# Patient Record
Sex: MALE | Race: BLACK OR AFRICAN AMERICAN | NOT HISPANIC OR LATINO
[De-identification: names, ages, dates, MRNs, and addresses within clinical notes are randomized per-mention and may not be internally consistent; named-entity substitution may affect disease eponyms.]

---

## 2018-05-01 ENCOUNTER — APPOINTMENT (OUTPATIENT)
Dept: ORTHOPEDIC SURGERY | Facility: CLINIC | Age: 73
End: 2018-05-01
Payer: MEDICARE

## 2018-05-01 VITALS
HEIGHT: 78 IN | WEIGHT: 310 LBS | DIASTOLIC BLOOD PRESSURE: 74 MMHG | SYSTOLIC BLOOD PRESSURE: 158 MMHG | BODY MASS INDEX: 35.87 KG/M2 | HEART RATE: 67 BPM

## 2018-05-01 DIAGNOSIS — M17.31 UNILATERAL POST-TRAUMATIC OSTEOARTHRITIS, RIGHT KNEE: ICD-10-CM

## 2018-05-01 PROCEDURE — 99213 OFFICE O/P EST LOW 20 MIN: CPT

## 2018-05-01 RX ORDER — IPRATROPIUM BROMIDE 42 UG/1
0.06 SPRAY NASAL
Qty: 15 | Refills: 0 | Status: ACTIVE | COMMUNITY
Start: 2018-04-04

## 2018-05-01 RX ORDER — TAMSULOSIN HYDROCHLORIDE 0.4 MG/1
0.4 CAPSULE ORAL
Qty: 30 | Refills: 0 | Status: ACTIVE | COMMUNITY
Start: 2017-12-19

## 2018-05-01 RX ORDER — CIPROFLOXACIN HYDROCHLORIDE 500 MG/1
500 TABLET, FILM COATED ORAL
Qty: 16 | Refills: 0 | Status: ACTIVE | COMMUNITY
Start: 2017-11-24

## 2018-05-01 RX ORDER — AMLODIPINE BESYLATE 2.5 MG/1
2.5 TABLET ORAL
Qty: 90 | Refills: 0 | Status: ACTIVE | COMMUNITY
Start: 2018-03-16

## 2018-05-01 RX ORDER — LOSARTAN POTASSIUM 100 MG/1
100 TABLET, FILM COATED ORAL
Qty: 90 | Refills: 0 | Status: ACTIVE | COMMUNITY
Start: 2018-02-15

## 2018-05-01 RX ORDER — FLUTICASONE PROPIONATE 50 UG/1
50 SPRAY, METERED NASAL
Qty: 16 | Refills: 0 | Status: ACTIVE | COMMUNITY
Start: 2018-02-28

## 2018-05-01 RX ORDER — ROSUVASTATIN CALCIUM 10 MG/1
10 TABLET, FILM COATED ORAL
Qty: 90 | Refills: 0 | Status: ACTIVE | COMMUNITY
Start: 2018-01-29

## 2018-05-01 RX ORDER — PHENAZOPYRIDINE HYDROCHLORIDE 200 MG/1
200 TABLET ORAL
Qty: 12 | Refills: 0 | Status: ACTIVE | COMMUNITY
Start: 2017-11-25

## 2018-05-01 RX ORDER — AZELASTINE HYDROCHLORIDE 137 UG/1
0.1 SPRAY, METERED NASAL
Qty: 30 | Refills: 0 | Status: ACTIVE | COMMUNITY
Start: 2018-03-16

## 2018-05-01 RX ORDER — DIAZEPAM 10 MG/1
10 TABLET ORAL
Qty: 1 | Refills: 0 | Status: ACTIVE | COMMUNITY
Start: 2017-11-24

## 2018-11-01 ENCOUNTER — EMERGENCY (EMERGENCY)
Facility: HOSPITAL | Age: 73
LOS: 1 days | Discharge: HOME | End: 2018-11-01
Attending: EMERGENCY MEDICINE
Payer: MEDICARE

## 2018-11-01 VITALS
HEART RATE: 62 BPM | WEIGHT: 298.06 LBS | DIASTOLIC BLOOD PRESSURE: 85 MMHG | HEIGHT: 78 IN | OXYGEN SATURATION: 98 % | RESPIRATION RATE: 16 BRPM | TEMPERATURE: 98 F | SYSTOLIC BLOOD PRESSURE: 147 MMHG

## 2018-11-01 DIAGNOSIS — N20.0 CALCULUS OF KIDNEY: ICD-10-CM

## 2018-11-01 LAB
ALBUMIN SERPL ELPH-MCNC: 3.8 G/DL — SIGNIFICANT CHANGE UP (ref 3.3–5)
ALP SERPL-CCNC: 45 U/L — SIGNIFICANT CHANGE UP (ref 40–120)
ALT FLD-CCNC: 10 U/L — SIGNIFICANT CHANGE UP (ref 10–45)
ANION GAP SERPL CALC-SCNC: 11 MMOL/L — SIGNIFICANT CHANGE UP (ref 5–17)
APPEARANCE UR: ABNORMAL
AST SERPL-CCNC: 19 U/L — SIGNIFICANT CHANGE UP (ref 10–40)
BACTERIA # UR AUTO: NEGATIVE — SIGNIFICANT CHANGE UP
BASOPHILS # BLD AUTO: 0 K/UL — SIGNIFICANT CHANGE UP (ref 0–0.2)
BASOPHILS NFR BLD AUTO: 0 % — SIGNIFICANT CHANGE UP (ref 0–2)
BILIRUB SERPL-MCNC: 1.3 MG/DL — HIGH (ref 0.2–1.2)
BILIRUB UR-MCNC: NEGATIVE — SIGNIFICANT CHANGE UP
BUN SERPL-MCNC: 14 MG/DL — SIGNIFICANT CHANGE UP (ref 7–23)
CALCIUM SERPL-MCNC: 9.7 MG/DL — SIGNIFICANT CHANGE UP (ref 8.4–10.5)
CHLORIDE SERPL-SCNC: 103 MMOL/L — SIGNIFICANT CHANGE UP (ref 96–108)
CO2 SERPL-SCNC: 27 MMOL/L — SIGNIFICANT CHANGE UP (ref 22–31)
COLOR SPEC: YELLOW — SIGNIFICANT CHANGE UP
CREAT SERPL-MCNC: 1.29 MG/DL — SIGNIFICANT CHANGE UP (ref 0.5–1.3)
DIFF PNL FLD: NEGATIVE — SIGNIFICANT CHANGE UP
EOSINOPHIL # BLD AUTO: 0 K/UL — SIGNIFICANT CHANGE UP (ref 0–0.5)
EOSINOPHIL NFR BLD AUTO: 0.4 % — SIGNIFICANT CHANGE UP (ref 0–6)
EPI CELLS # UR: 0 /HPF — SIGNIFICANT CHANGE UP
GLUCOSE SERPL-MCNC: 103 MG/DL — HIGH (ref 70–99)
GLUCOSE UR QL: NEGATIVE — SIGNIFICANT CHANGE UP
HCT VFR BLD CALC: 40.4 % — SIGNIFICANT CHANGE UP (ref 39–50)
HGB BLD-MCNC: 13.3 G/DL — SIGNIFICANT CHANGE UP (ref 13–17)
HYALINE CASTS # UR AUTO: 15 /LPF — HIGH (ref 0–2)
KETONES UR-MCNC: NEGATIVE — SIGNIFICANT CHANGE UP
LEUKOCYTE ESTERASE UR-ACNC: ABNORMAL
LIDOCAIN IGE QN: 58 U/L — SIGNIFICANT CHANGE UP (ref 7–60)
LYMPHOCYTES # BLD AUTO: 0.6 K/UL — LOW (ref 1–3.3)
LYMPHOCYTES # BLD AUTO: 10.7 % — LOW (ref 13–44)
MCHC RBC-ENTMCNC: 28.5 PG — SIGNIFICANT CHANGE UP (ref 27–34)
MCHC RBC-ENTMCNC: 32.9 GM/DL — SIGNIFICANT CHANGE UP (ref 32–36)
MCV RBC AUTO: 86.6 FL — SIGNIFICANT CHANGE UP (ref 80–100)
MONOCYTES # BLD AUTO: 0.5 K/UL — SIGNIFICANT CHANGE UP (ref 0–0.9)
MONOCYTES NFR BLD AUTO: 8.2 % — SIGNIFICANT CHANGE UP (ref 2–14)
NEUTROPHILS # BLD AUTO: 4.6 K/UL — SIGNIFICANT CHANGE UP (ref 1.8–7.4)
NEUTROPHILS NFR BLD AUTO: 80.7 % — HIGH (ref 43–77)
NITRITE UR-MCNC: NEGATIVE — SIGNIFICANT CHANGE UP
PH UR: 6.5 — SIGNIFICANT CHANGE UP (ref 5–8)
PLATELET # BLD AUTO: 177 K/UL — SIGNIFICANT CHANGE UP (ref 150–400)
POTASSIUM SERPL-MCNC: 3.9 MMOL/L — SIGNIFICANT CHANGE UP (ref 3.5–5.3)
POTASSIUM SERPL-SCNC: 3.9 MMOL/L — SIGNIFICANT CHANGE UP (ref 3.5–5.3)
PROT SERPL-MCNC: 7.3 G/DL — SIGNIFICANT CHANGE UP (ref 6–8.3)
PROT UR-MCNC: ABNORMAL
RBC # BLD: 4.66 M/UL — SIGNIFICANT CHANGE UP (ref 4.2–5.8)
RBC # FLD: 13.3 % — SIGNIFICANT CHANGE UP (ref 10.3–14.5)
RBC CASTS # UR COMP ASSIST: 2 /HPF — SIGNIFICANT CHANGE UP (ref 0–4)
SODIUM SERPL-SCNC: 141 MMOL/L — SIGNIFICANT CHANGE UP (ref 135–145)
SP GR SPEC: 1.03 — HIGH (ref 1.01–1.02)
UROBILINOGEN FLD QL: ABNORMAL
WBC # BLD: 5.8 K/UL — SIGNIFICANT CHANGE UP (ref 3.8–10.5)
WBC # FLD AUTO: 5.8 K/UL — SIGNIFICANT CHANGE UP (ref 3.8–10.5)
WBC UR QL: 30 /HPF — HIGH (ref 0–5)

## 2018-11-01 PROCEDURE — 74176 CT ABD & PELVIS W/O CONTRAST: CPT | Mod: 26

## 2018-11-01 PROCEDURE — 99284 EMERGENCY DEPT VISIT MOD MDM: CPT | Mod: GC,25

## 2018-11-01 RX ORDER — MORPHINE SULFATE 50 MG/1
4 CAPSULE, EXTENDED RELEASE ORAL ONCE
Qty: 0 | Refills: 0 | Status: DISCONTINUED | OUTPATIENT
Start: 2018-11-01 | End: 2018-11-01

## 2018-11-01 RX ORDER — CIPROFLOXACIN LACTATE 400MG/40ML
1 VIAL (ML) INTRAVENOUS
Qty: 14 | Refills: 0 | OUTPATIENT
Start: 2018-11-01 | End: 2018-11-07

## 2018-11-01 RX ORDER — OXYCODONE AND ACETAMINOPHEN 5; 325 MG/1; MG/1
1 TABLET ORAL ONCE
Qty: 0 | Refills: 0 | Status: DISCONTINUED | OUTPATIENT
Start: 2018-11-01 | End: 2018-11-01

## 2018-11-01 RX ORDER — SODIUM CHLORIDE 9 MG/ML
1000 INJECTION INTRAMUSCULAR; INTRAVENOUS; SUBCUTANEOUS ONCE
Qty: 0 | Refills: 0 | Status: COMPLETED | OUTPATIENT
Start: 2018-11-01 | End: 2018-11-01

## 2018-11-01 RX ORDER — SODIUM CHLORIDE 9 MG/ML
1000 INJECTION INTRAMUSCULAR; INTRAVENOUS; SUBCUTANEOUS ONCE
Qty: 0 | Refills: 0 | Status: DISCONTINUED | OUTPATIENT
Start: 2018-11-01 | End: 2018-11-01

## 2018-11-01 RX ORDER — CEFTRIAXONE 500 MG/1
1 INJECTION, POWDER, FOR SOLUTION INTRAMUSCULAR; INTRAVENOUS ONCE
Qty: 0 | Refills: 0 | Status: COMPLETED | OUTPATIENT
Start: 2018-11-01 | End: 2018-11-01

## 2018-11-01 RX ORDER — KETOROLAC TROMETHAMINE 30 MG/ML
30 SYRINGE (ML) INJECTION ONCE
Qty: 0 | Refills: 0 | Status: DISCONTINUED | OUTPATIENT
Start: 2018-11-01 | End: 2018-11-01

## 2018-11-01 RX ORDER — OXYCODONE HYDROCHLORIDE 5 MG/1
1 TABLET ORAL
Qty: 12 | Refills: 0 | OUTPATIENT
Start: 2018-11-01 | End: 2018-11-03

## 2018-11-01 RX ADMIN — SODIUM CHLORIDE 1000 MILLILITER(S): 9 INJECTION INTRAMUSCULAR; INTRAVENOUS; SUBCUTANEOUS at 14:40

## 2018-11-01 RX ADMIN — MORPHINE SULFATE 4 MILLIGRAM(S): 50 CAPSULE, EXTENDED RELEASE ORAL at 19:37

## 2018-11-01 RX ADMIN — MORPHINE SULFATE 4 MILLIGRAM(S): 50 CAPSULE, EXTENDED RELEASE ORAL at 21:30

## 2018-11-01 RX ADMIN — OXYCODONE AND ACETAMINOPHEN 1 TABLET(S): 5; 325 TABLET ORAL at 23:57

## 2018-11-01 RX ADMIN — OXYCODONE AND ACETAMINOPHEN 1 TABLET(S): 5; 325 TABLET ORAL at 21:29

## 2018-11-01 RX ADMIN — CEFTRIAXONE 100 GRAM(S): 500 INJECTION, POWDER, FOR SOLUTION INTRAMUSCULAR; INTRAVENOUS at 19:37

## 2018-11-01 RX ADMIN — CEFTRIAXONE 1 GRAM(S): 500 INJECTION, POWDER, FOR SOLUTION INTRAMUSCULAR; INTRAVENOUS at 20:13

## 2018-11-01 RX ADMIN — MORPHINE SULFATE 4 MILLIGRAM(S): 50 CAPSULE, EXTENDED RELEASE ORAL at 20:17

## 2018-11-01 RX ADMIN — MORPHINE SULFATE 4 MILLIGRAM(S): 50 CAPSULE, EXTENDED RELEASE ORAL at 17:13

## 2018-11-01 RX ADMIN — Medication 30 MILLIGRAM(S): at 14:38

## 2018-11-01 RX ADMIN — Medication 30 MILLIGRAM(S): at 15:08

## 2018-11-01 NOTE — ED PROVIDER NOTE - PHYSICAL EXAMINATION
General: well appearing male, no acute distress   HEENt: normocephalic, atraumatic   Respiratory: normal work fo breathing, lungs clear to auscultation bilaterally   Cardiac: regular rate and rhythm   ABdomen: soft, non-tender, no CVA tenderness   MSK: no swelling or tenderness of extremities   Skin: no rashes   NEuro: A&Ox3

## 2018-11-01 NOTE — ED PROVIDER NOTE - SHIFT CHANGE DETAILS
Received sign-out from Dr. Garcia awaiting  eval in anticipation of admission vs placement in CDU due to intractable pain.  has completed their evaluation and have recommended placement in the CDU.

## 2018-11-01 NOTE — ED ADULT NURSE NOTE - NSFALLRSKPASTHIST_ED_ALL_ED
Writer spoke to patient's wife, conveyed message as per NP. Acetic Acid 3% sent via [employer] to [facility] pharmacy by NP. She will wait for the pharmacy to call her to  the Acetic Acid 3%. Writer instruct to contact us if patient has any questions or concerns. Denies further questions or concerns at this time.    no

## 2018-11-01 NOTE — CONSULT NOTE ADULT - PROBLEM SELECTOR RECOMMENDATION 9
-Pt afebrile, VSS, no leukocytosis or signs of infection  -Trial of medical expulsive therapy  -Flomax 0.4mg qHS x30 days  -Percocet PRN pain - may keep in CDU if requiring further pain control, pt appears comfortable  -Zofran PRN nausea  -Senna, colace, miralax  -Aggressive hydration  -Strain urine for calculi  -Return to ER for any fever > 100.4, pain uncontrolled on discharge pain medications, or inability to tolerate PO  -Bactrim x7 days  -Follow up with Dr. Randall in 1 to 2 weeks

## 2018-11-01 NOTE — ED PROVIDER NOTE - ATTENDING CONTRIBUTION TO CARE
Pt with 10/10 L flank pain, radiates to LUQ, has prior KS R side years ago that passed on its own, no recent imaging, exam nontender.

## 2018-11-01 NOTE — ED PROVIDER NOTE - PROGRESS NOTE DETAILS
Dr. Appiah Note: pt with 9/10 pain after meds, will give additional pain meds, check ct scan, s/o to evening doc pending results and pain control.  Anticipating d/c vs CDU depending on results and pain control. updated patient on results. reorts symptoms moderately improved. awaiting UA. - resident Kory Flores Arthur PGY2: pt seen by urology, symptoms improved initially but pain returns despite percocet, pt uncomfortable w/ going home. urology recommended cdu and bactrim x 7 days, would not intervene/ stent at this time, will follow

## 2018-11-01 NOTE — CONSULT NOTE ADULT - ASSESSMENT
73y Male with a PMHx of Acromegaly and previous kidney stone presents with uncomplicated 9 mm left prox stone. 73y Male with a PMHx of Acromegaly and previous kidney stone presents with uncomplicated 9 mm left prox stone. Case discussed with Dr Jim Casillas, will plan to send home with pain control and follow up in office next week.

## 2018-11-01 NOTE — ED ADULT NURSE NOTE - NSIMPLEMENTINTERV_GEN_ALL_ED
Implemented All Universal Safety Interventions:  [place] to call system. Call bell, personal items and telephone within reach. Instruct patient to call for assistance. Room bathroom lighting operational. Non-slip footwear when patient is off stretcher. Physically safe environment: no spills, clutter or unnecessary equipment. Stretcher in lowest position, wheels locked, appropriate side rails in place.

## 2018-11-01 NOTE — ED ADULT NURSE NOTE - CHPI ED NUR SYMPTOMS NEG
no weakness/no fever/no dizziness/no chills/no vomiting/no tingling/no decreased eating/drinking/no nausea

## 2018-11-01 NOTE — CONSULT NOTE ADULT - SUBJECTIVE AND OBJECTIVE BOX
HPI:  Patient is a 73y Male with a PMHx of Acromegaly and previous right kidney stone 4 years ago that passed on its own presenting to the ED with left sided flank pain x1 week. Pain initially thought to be from heavy lifting but increased to 10/10 pain radiating to left groin upon waking up this morning. Pain feels similar to previous stone. CT AP reveals 9 mm left proximal ureteral stone with moderate left hydronephrosis. Pt received Toradol 30 mg IVP x1, Morphine 4 mg IVP x2, Percocet 1 tab x1, still in 6/10 pain. Pt denies fevers, chills, nausea, vomiting, pain or burning with urination, blood in urine, pain or swelling in lower extremities. Pt sees private urologist Dr. Randall.     PAST MEDICAL & SURGICAL HISTORY:  Acromegaly    FAMILY HISTORY:    SOCIAL HISTORY:   Tobacco hx: never smoker    MEDICATIONS  (STANDING):    MEDICATIONS  (PRN):    Allergies    No Known Allergies    Intolerances    REVIEW OF SYSTEMS: Pertinent positives and negatives as stated in HPI, otherwise negative    Vital signs  T(C): 37 (18 @ 20:16), Max: 37 (18 @ 20:16)  HR: 62 (18 @ 20:16)  BP: 129/78 (18 @ 20:16)  SpO2: 99% (18 @ 20:16)  Wt(kg): --    Output    UOP    Physical Exam  Gen: sitting up in stretcher, does not appear to be in pain, NAD  Pulm: No respiratory distress, no subcostal retractions  CV: RRR, no JVD  Abd: Soft, NT, ND  : No discharge or blood at urethral meatus.  Testes descended bilaterally.  Testes and epididymis nontender bilaterally. No CVAT b/l/  MSK: No edema present    LABS:     @ 14:37    WBC 5.8   / Hct 40.4  / SCr 1.29         141  |  103  |  14  ----------------------------<  103<H>  3.9   |  27  |  1.29    Ca    9.7      2018 14:37    TPro  7.3  /  Alb  3.8  /  TBili  1.3<H>  /  DBili  x   /  AST  19  /  ALT  10  /  AlkPhos  45  11-01      Urinalysis Basic - ( 2018 18:50 )    Color: Yellow / Appearance: Slightly Turbid / S.028 / pH: x  Gluc: x / Ketone: Negative  / Bili: Negative / Urobili: 2 mg/dL   Blood: x / Protein: 30 mg/dL / Nitrite: Negative   Leuk Esterase: Large / RBC: 2 /hpf / WBC 30 /hpf   Sq Epi: x / Non Sq Epi: 0 /hpf / Bacteria: Negative        Urine Cx: pending      RADIOLOGY:  < from: CT Abdomen and Pelvis No Cont (18 @ 16:14) >  KIDNEYS/URETERS: Moderate left hydroureteronephrosis, secondary to a 0.9   cm calculus in the proximal left ureter. There is associated left   perinephric stranding. A 2 mm nonobstructing left intrarenal calculus is   noted in a lower pole calyx. Left renal cysts are again noted. No   hydronephrosis of the right kidney. A punctate nonobstructing calculus is   seen in the lower pole of the right kidney.    IMPRESSION: Moderate left hydroureteronephrosis, secondary to a 0.9 cm   calculus in the proximal left ureter. There is associated left   perinephric stranding. Small nonobstructing intrarenal calculi are   present bilaterally.    < end of copied text >

## 2018-11-01 NOTE — ED ADULT NURSE NOTE - OBJECTIVE STATEMENT
73 year old male presents to the ED ambulatory through waiting room with wife complaining of 10/10 left flank pain x 1 week, worsening yesterday and this morning. PMH of right kidney stones in 2015, HTN, HLD. Pt. states this feels the same as when he had kidney stones in the past. Patient denies fever, chills, nausea, vomiting, diarrhea, dysuria, hematuria, recent travel, recent sick contacts. 20g peripheral IV placed in right AC and labs drawn and sent to lab. Patient undressed and placed into gown, call bell in hand and side rails up with bed in lowest position for safety. blanket provided. Comfort and safety provided.

## 2018-11-01 NOTE — ED PROVIDER NOTE - OBJECTIVE STATEMENT
73M, pmh of acromegaly, kidney stone, presenting with flank pain. Patient reports general back pain for the past week which signficant worsened today. Pain is left sided and radiates to stomach/groin. Feels similar to previous kidney stone. Denies any fever, nausea, vomiting, pain or burning with urination, blood in urine, pain or swelling in lower extremities. Pain is currently 9/10, did not try pain medicine at home. Does not believe he ever had a CT of his abdomen.

## 2018-11-02 VITALS
RESPIRATION RATE: 18 BRPM | SYSTOLIC BLOOD PRESSURE: 140 MMHG | OXYGEN SATURATION: 98 % | HEART RATE: 72 BPM | DIASTOLIC BLOOD PRESSURE: 80 MMHG

## 2018-11-02 LAB
ANION GAP SERPL CALC-SCNC: 11 MMOL/L — SIGNIFICANT CHANGE UP (ref 5–17)
ANION GAP SERPL CALC-SCNC: 9 MMOL/L — SIGNIFICANT CHANGE UP (ref 5–17)
BUN SERPL-MCNC: 15 MG/DL — SIGNIFICANT CHANGE UP (ref 7–23)
BUN SERPL-MCNC: 16 MG/DL — SIGNIFICANT CHANGE UP (ref 7–23)
CALCIUM SERPL-MCNC: 9.1 MG/DL — SIGNIFICANT CHANGE UP (ref 8.4–10.5)
CALCIUM SERPL-MCNC: 9.3 MG/DL — SIGNIFICANT CHANGE UP (ref 8.4–10.5)
CHLORIDE SERPL-SCNC: 102 MMOL/L — SIGNIFICANT CHANGE UP (ref 96–108)
CHLORIDE SERPL-SCNC: 105 MMOL/L — SIGNIFICANT CHANGE UP (ref 96–108)
CO2 SERPL-SCNC: 22 MMOL/L — SIGNIFICANT CHANGE UP (ref 22–31)
CO2 SERPL-SCNC: 26 MMOL/L — SIGNIFICANT CHANGE UP (ref 22–31)
CREAT SERPL-MCNC: 1.51 MG/DL — HIGH (ref 0.5–1.3)
CREAT SERPL-MCNC: 1.55 MG/DL — HIGH (ref 0.5–1.3)
CULTURE RESULTS: SIGNIFICANT CHANGE UP
GLUCOSE SERPL-MCNC: 100 MG/DL — HIGH (ref 70–99)
GLUCOSE SERPL-MCNC: 103 MG/DL — HIGH (ref 70–99)
POTASSIUM SERPL-MCNC: 4.3 MMOL/L — SIGNIFICANT CHANGE UP (ref 3.5–5.3)
POTASSIUM SERPL-MCNC: 4.5 MMOL/L — SIGNIFICANT CHANGE UP (ref 3.5–5.3)
POTASSIUM SERPL-SCNC: 4.3 MMOL/L — SIGNIFICANT CHANGE UP (ref 3.5–5.3)
POTASSIUM SERPL-SCNC: 4.5 MMOL/L — SIGNIFICANT CHANGE UP (ref 3.5–5.3)
SODIUM SERPL-SCNC: 136 MMOL/L — SIGNIFICANT CHANGE UP (ref 135–145)
SODIUM SERPL-SCNC: 139 MMOL/L — SIGNIFICANT CHANGE UP (ref 135–145)
SPECIMEN SOURCE: SIGNIFICANT CHANGE UP

## 2018-11-02 PROCEDURE — 80053 COMPREHEN METABOLIC PANEL: CPT

## 2018-11-02 PROCEDURE — 96376 TX/PRO/DX INJ SAME DRUG ADON: CPT

## 2018-11-02 PROCEDURE — 85027 COMPLETE CBC AUTOMATED: CPT

## 2018-11-02 PROCEDURE — 96375 TX/PRO/DX INJ NEW DRUG ADDON: CPT

## 2018-11-02 PROCEDURE — 87086 URINE CULTURE/COLONY COUNT: CPT

## 2018-11-02 PROCEDURE — 83690 ASSAY OF LIPASE: CPT

## 2018-11-02 PROCEDURE — 80048 BASIC METABOLIC PNL TOTAL CA: CPT

## 2018-11-02 PROCEDURE — 74176 CT ABD & PELVIS W/O CONTRAST: CPT

## 2018-11-02 PROCEDURE — 81001 URINALYSIS AUTO W/SCOPE: CPT

## 2018-11-02 PROCEDURE — 96365 THER/PROPH/DIAG IV INF INIT: CPT

## 2018-11-02 PROCEDURE — G0378: CPT

## 2018-11-02 PROCEDURE — 99284 EMERGENCY DEPT VISIT MOD MDM: CPT | Mod: 25

## 2018-11-02 PROCEDURE — 99236 HOSP IP/OBS SAME DATE HI 85: CPT

## 2018-11-02 RX ORDER — TAMSULOSIN HYDROCHLORIDE 0.4 MG/1
0.4 CAPSULE ORAL AT BEDTIME
Qty: 0 | Refills: 0 | Status: DISCONTINUED | OUTPATIENT
Start: 2018-11-02 | End: 2018-11-05

## 2018-11-02 RX ORDER — ATORVASTATIN CALCIUM 80 MG/1
40 TABLET, FILM COATED ORAL AT BEDTIME
Qty: 0 | Refills: 0 | Status: DISCONTINUED | OUTPATIENT
Start: 2018-11-02 | End: 2018-11-05

## 2018-11-02 RX ORDER — OXYCODONE AND ACETAMINOPHEN 5; 325 MG/1; MG/1
1 TABLET ORAL EVERY 4 HOURS
Qty: 0 | Refills: 0 | Status: DISCONTINUED | OUTPATIENT
Start: 2018-11-02 | End: 2018-11-02

## 2018-11-02 RX ORDER — LOSARTAN POTASSIUM 100 MG/1
25 TABLET, FILM COATED ORAL DAILY
Qty: 0 | Refills: 0 | Status: DISCONTINUED | OUTPATIENT
Start: 2018-11-02 | End: 2018-11-05

## 2018-11-02 RX ORDER — SODIUM CHLORIDE 9 MG/ML
1000 INJECTION INTRAMUSCULAR; INTRAVENOUS; SUBCUTANEOUS
Qty: 0 | Refills: 0 | Status: DISCONTINUED | OUTPATIENT
Start: 2018-11-02 | End: 2018-11-05

## 2018-11-02 RX ORDER — AZTREONAM 2 G
1 VIAL (EA) INJECTION
Qty: 14 | Refills: 0
Start: 2018-11-02 | End: 2018-11-08

## 2018-11-02 RX ORDER — ONDANSETRON 8 MG/1
4 TABLET, FILM COATED ORAL EVERY 4 HOURS
Qty: 0 | Refills: 0 | Status: DISCONTINUED | OUTPATIENT
Start: 2018-11-02 | End: 2018-11-05

## 2018-11-02 RX ORDER — TAMSULOSIN HYDROCHLORIDE 0.4 MG/1
1 CAPSULE ORAL
Qty: 7 | Refills: 0
Start: 2018-11-02 | End: 2018-11-08

## 2018-11-02 RX ADMIN — Medication 1 TABLET(S): at 06:47

## 2018-11-02 RX ADMIN — OXYCODONE AND ACETAMINOPHEN 1 TABLET(S): 5; 325 TABLET ORAL at 15:05

## 2018-11-02 RX ADMIN — SODIUM CHLORIDE 125 MILLILITER(S): 9 INJECTION INTRAMUSCULAR; INTRAVENOUS; SUBCUTANEOUS at 07:52

## 2018-11-02 RX ADMIN — SODIUM CHLORIDE 125 MILLILITER(S): 9 INJECTION INTRAMUSCULAR; INTRAVENOUS; SUBCUTANEOUS at 13:10

## 2018-11-02 RX ADMIN — SODIUM CHLORIDE 125 MILLILITER(S): 9 INJECTION INTRAMUSCULAR; INTRAVENOUS; SUBCUTANEOUS at 00:31

## 2018-11-02 RX ADMIN — TAMSULOSIN HYDROCHLORIDE 0.4 MILLIGRAM(S): 0.4 CAPSULE ORAL at 00:31

## 2018-11-02 RX ADMIN — ATORVASTATIN CALCIUM 40 MILLIGRAM(S): 80 TABLET, FILM COATED ORAL at 00:31

## 2018-11-02 RX ADMIN — OXYCODONE AND ACETAMINOPHEN 1 TABLET(S): 5; 325 TABLET ORAL at 07:51

## 2018-11-02 RX ADMIN — SODIUM CHLORIDE 125 MILLILITER(S): 9 INJECTION INTRAMUSCULAR; INTRAVENOUS; SUBCUTANEOUS at 15:11

## 2018-11-02 RX ADMIN — OXYCODONE AND ACETAMINOPHEN 1 TABLET(S): 5; 325 TABLET ORAL at 15:04

## 2018-11-02 RX ADMIN — OXYCODONE AND ACETAMINOPHEN 1 TABLET(S): 5; 325 TABLET ORAL at 11:00

## 2018-11-02 RX ADMIN — SODIUM CHLORIDE 125 MILLILITER(S): 9 INJECTION INTRAMUSCULAR; INTRAVENOUS; SUBCUTANEOUS at 13:11

## 2018-11-02 RX ADMIN — LOSARTAN POTASSIUM 25 MILLIGRAM(S): 100 TABLET, FILM COATED ORAL at 06:47

## 2018-11-02 NOTE — ED CDU PROVIDER DISPOSITION NOTE - CLINICAL COURSE
72y/o M with PMH of acromegaly, HTN, HLD, kidney stones, c/o L flank pain x 1 wk. Patient reports general back pain for the past week which signficantly worsened today. Pain is left sided and radiates to L abdomen. pt states the pain feels similar to previous kidney stone. Pain was 10/10, did not try pain medicine at home. Notes having some chills and nausea with pain. Denies any fever, sweats, vomiting, pain or burning with urination, blood in urine, urinary frequency/urgency, pain or swelling in lower extremities, CP, SOB, problems walking.    In ED, UA positive for UTI, otherwise labs unremarkable, CT a/p showed moderate L hydro and stranding with 0.9cm L proximal ureter stone and small nonobstructing intrarenal calculi b/l. urology c/s'd recommend fluids, straining, flomax, bactrim, pain control. pt sent to CDU for uro recs and monitoring. 72y/o M with PMH of acromegaly, HTN, HLD, kidney stones, c/o L flank pain x 1 wk. Patient reports general back pain for the past week which signficantly worsened today. Pain is left sided and radiates to L abdomen. pt states the pain feels similar to previous kidney stone. Pain was 10/10, did not try pain medicine at home. Notes having some chills and nausea with pain. Denies any fever, sweats, vomiting, pain or burning with urination, blood in urine, urinary frequency/urgency, pain or swelling in lower extremities, CP, SOB, problems walking.    In ED, UA positive for UTI, otherwise labs unremarkable, CT a/p showed moderate L hydro and stranding with 0.9cm L proximal ureter stone and small nonobstructing intrarenal calculi b/l. urology c/s'd recommend fluids, straining, flomax, bactrim, pain control. pt sent to CDU for uro recs and monitoring. Patient's pain was still 10/10 in AM w/ improvement to 8/10 w/ percocet. Repeat creatinine was elevated to 1.55 up from 1.29. Uro came to evaluate patient again and felt despite Cr bump, if pain controlled then can d/c. Continued IVF during day, repeat Cr was 1.51 72y/o M with PMH of acromegaly, HTN, HLD, kidney stones, c/o L flank pain x 1 wk. Patient reports general back pain for the past week which significantly worsened today. Pain is left sided and radiates to L abdomen. pt states the pain feels similar to previous kidney stone. Pain was 10/10, did not try pain medicine at home. Notes having some chills and nausea with pain. Denies any fever, sweats, vomiting, pain or burning with urination, blood in urine, urinary frequency/urgency, pain or swelling in lower extremities, CP, SOB, problems walking.    In ED, UA positive for UTI, otherwise labs unremarkable, CT a/p showed moderate L hydro and stranding with 0.9cm L proximal ureter stone and small nonobstructing intrarenal calculi b/l. urology c/s'd recommend fluids, straining, flomax, bactrim, pain control. pt sent to CDU for uro recs and monitoring. Patient's pain was still 10/10 in AM w/ improvement to 8/10 w/ percocet. Repeat creatinine was elevated to 1.55 up from 1.29. Uro came to evaluate patient again and felt despite Cr bump, if pain controlled then can d/c. Continued IVF during day, repeat Cr was 1.51. Pain control improved to point where level after percocet was 5/10 and tolerable for pt. Case was discussed w/ patient's urologist Dr. Randall who did not feel patient needed to be admitted and felt comfortable w/ patient being discharged home and having outpatient procedure done. Spoke w/ ED attending who was agreeable with this plan as was the patient. Stable at time of discharge, given all follow up information, discharge info, medication prescriptions and strict return precautions. Case discussed with ED attending. 72y/o M with PMH of acromegaly, HTN, HLD, kidney stones, c/o L flank pain x 1 wk. Patient reports general back pain for the past week which significantly worsened today. Pain is left sided and radiates to L abdomen. pt states the pain feels similar to previous kidney stone. Pain was 10/10, did not try pain medicine at home. Notes having some chills and nausea with pain. Denies any fever, sweats, vomiting, pain or burning with urination, blood in urine, urinary frequency/urgency, pain or swelling in lower extremities, CP, SOB, problems walking.    In ED, UA positive for UTI, otherwise labs unremarkable, CT a/p showed moderate L hydro and stranding with 0.9cm L proximal ureter stone and small non-obstructing intrarenal calculi b/l. urology c/s'd recommend fluids, straining, flomax, bactrim, pain control. pt sent to CDU for uro recs and monitoring. Patient's pain was still 10/10 in AM w/ improvement to 8/10 w/ percocet. Repeat creatinine was elevated to 1.55 up from 1.29. Uro came to evaluate patient again and felt despite Cr bump, if pain controlled then can d/c. Continued IVF during day, repeat Cr was 1.51. Pain control improved to point where level after percocet was 5/10 and tolerable for pt. Case was discussed w/ patient's urologist Dr. Randall who did not feel patient needed to be admitted and felt comfortable w/ patient being discharged home and having outpatient procedure done. Spoke w/ ED attending who was agreeable with this plan as was the patient. Stable at time of discharge, given all follow up information, discharge info, medication prescriptions and strict return precautions. Case discussed with ED attending.

## 2018-11-02 NOTE — ED CDU PROVIDER DISPOSITION NOTE - PLAN OF CARE
1. Take Flomax 0.4mg daily, Motrin 600mg every 8 hrs for pain, Oxycodone 5mg orally every 6 hours as needed for pain (may cause drowsiness - do not drive); and Zofran ODT 4mg orally every 6 hours as needed for nausea/vomiting.  2. Increase your fluid intake and continue to strain your urine.   3. Follow up with your PMD within 48-72 hrs. Follow up with Urology this week.   4. Any worsening pain, fever, chills, difficulty urinating, return to ED 1. Take Flomax 0.4mg daily. Additionally take 1 tab of Percocet 5mg/325mg every 4-6 hours as needed for pain. This medication already has 325 mg of Tylenol in it per dose, so ONLY IF NEEDED you may take additional 650mg of tylenol 3 times a day for additional relief, but do not exceed 4,000 mg total worth of tylenol in one day. Caution: Percocet can cause drowsiness, so please do not drive or drink alcohol while taking this medication.  2. Increase your fluid intake and continue to strain your urine.   3. Follow up with your PMD within 48-72 hrs. Follow up with your urologist Dr. Randall  4. If you develop any worsening pain, fever, chills, difficulty urinating, abdominal pain or any other concerning symptoms please return to ED immediately. 1. Take Flomax 0.4mg daily. Additionally take 1 tab of Percocet 5mg/325mg every 4-6 hours as needed for pain. This medication already has 325 mg of Tylenol in it per dose, so ONLY IF NEEDED you may take additional 650mg of tylenol 3 times a day for additional relief, but do not exceed 4,000 mg total worth of tylenol in one day. Caution: Percocet can cause drowsiness, so please do not drive or drink alcohol while taking this medication.  2. Take Bactrim as prescribed.  3. Increase your fluid intake and continue to strain your urine.   4. Follow up with your PMD within 48-72 hrs. Follow up with your urologist Dr. Randall  5. If you develop any worsening pain, fever, chills, difficulty urinating, abdominal pain or any other concerning symptoms please return to ED immediately.

## 2018-11-02 NOTE — ED CDU PROVIDER INITIAL DAY NOTE - MEDICAL DECISION MAKING DETAILS
Placed in the CDU for more continuous IVF, pain control, clinical reassessment and trending of renal fxn.

## 2018-11-02 NOTE — ED CDU PROVIDER INITIAL DAY NOTE - PROGRESS NOTE DETAILS
CDU NOTE JAQUAN BOLAND: Pt resting comfortably, feeling well without complaint. NAD, VSS. will continue monitoring. Patient seen at bedside in NAD.  VSS.  Patient resting comfortably although endorsed return of left sided back pain. Gave percocet, will reassess to evaluate response to pain meds. Creatinine increased from 1.29 to 1.55 on repeat BMP this AM. Denies fever/chills, weakness, n/v. Will continue to obs, provide IVF hydration, and re-discuss w/ uro given creatinine increase. - Herber Siegel PA-C Uro saw patient at bedside. They feel if patient becomes pain controlled then can go home. Informed them of the creatinine elevation from 1.29 to 1.55. They feel likely 2/2 toradol from yesterday, however they will discuss with their attending. - Herber Siegel PA-C Patient seen at bedside in NAD.  VSS.  Patient resting comfortably without complaints. Patient seen at bedside in NAD.  VSS.  Patient resting comfortably. Pain now a 7/10 s/p oxycodone, was 10/10 initially. States still feels it but is more tolerable now. Denies fever/chills. Repeat creatinine pending. Will continue to monitor. - Herber Siegel PA-C Patient seen at bedside in NAD.  VSS.  Patient resting comfortably. Reported onset of pain again, 10/10 earlier, gave percocet w/ mild relief to 8/10 however still feeling pain. Denies fever/chills. Case discussed w/ uro again as pain not entirely controlled. Patient informs CDU team and uro team that he's been speaking with his urologist Dr. Randall who wished to have results faxed to his office to review. Received permission from pt to fax results, confirmed fax # w/ office and sent. Additionally left callback # w/ office for Dr. Randall to call me back to discuss care of patient going forward. Uro agreeable with this and continuing pain control in meantime. - Herber Siegel PA-C Dr. Randall returned call, informed me he received the faxed over results. Informed him of all the above including pain level while in CDU. He believes patient can go home and have procedure done as outpatient. Does not feel patient needs to stay as long as pain medication decreased pain. Will discuss w/ patient and ED attending to determine final dispo. - Herber Siegel PA-C Patient seen at bedside in NAD.  VSS.  Patient resting comfortably. Reported onset of pain again, 10/10 earlier, gave percocet w/ relief to 6/10 however still feeling pain. Denies fever/chills. Case discussed w/ uro again as pain not entirely controlled. Patient informs CDU team and uro team that he's been speaking with his urologist Dr. Randall who wished to have results faxed to his office to review. Received permission from pt to fax results, confirmed fax # w/ office and sent. Additionally left callback # w/ office for Dr. Randall to call me back to discuss care of patient going forward. Uro agreeable with this and continuing pain control in meantime. - Herber Siegel PA-C Dr. Randall returned call, informed me he received the faxed over results. Informed him of all the above including pain level while in CDU. He believes patient can go home and have procedure done as outpatient. Does not feel patient needs to stay as long as pain medication decreased pain. Patient states pain now 5/ 10 and he feels this level is tolerable for him to go home with. Advised that if he does not feel comfortable w/ this. Will discuss w/ patient and ED attending to determine final dispo. - Herber Siegel PA-C Case discussed w/ ED attending who agreed w/ discharge. Patient stable for discharge, pain controlled. Tolerating PO intake and oral meds. Advised no motrin given Cr elevation and advised to stay hydrated and f/u with urologist regarding elevated level, has close follow up monday morning with his private urologist. Pt evaluated by ED MD who cleared for discharge home. Gave copy of and went over all results with patient at bedside. Discussed importance of PMD f/u in next 1-2 days and advised on strict return precautions. Stable for d/c. - Herber Siegel PA-C

## 2018-11-02 NOTE — ED CDU PROVIDER DISPOSITION NOTE - ATTENDING CONTRIBUTION TO CARE
73M, pmh of acromegaly, kidney stone, presented with L flank pain.  Pain is left sided and radiates to stomach/groin. Feels similar to previous kidney stone. Denies any fever, nausea, vomiting, pain or burning with urination, blood in urine, pain or swelling in lower extremities.     in the ED CT a/p demonstrating 0mm L ureteral stone with moderate hydro. UA appears infection, treated with ceftriaxone in the ER. initial cr 1.29 --> increased to 1.55. urology was consulted, they recommended dc if pain is controlled.   this morning patient had severe pain, was given percocet prior to my eval with slight improvement of pain. contionues to receive abx IV. will repeat Cr and continue to ob patient to monitor pain. VS stable, afrebrile 73M, pmh of acromegaly, kidney stone, presented with L flank pain.  Pain is left sided and radiates to stomach/groin. Feels similar to previous kidney stone. Denies any fever, nausea, vomiting, pain or burning with urination, blood in urine, pain or swelling in lower extremities.     in the ED CT a/p demonstrating 0mm L ureteral stone with moderate hydro. UA appears infection, treated with ceftriaxone in the ER. initial cr 1.29 --> increased to 1.55. urology was consulted, they recommended dc if pain is controlled.   patient was given percocet in the CDU for pain control with improvement of pain. contionued to receive abx IV. Cr downtrending 1.55 --> 1.51. urology was re-consulted, they evaluated patient. given improved renal function and pain controlled recommended patient discharge. patient with normal VS, afebrile. pain controlled. patient comfortable with d/c. Patient was discharged with instructions to drink fluids, flomax daily, abx , and follow up with urology in 1-2 days for repeat evaluation and further management.    I reviewed all lab and imaging results from this ED visit, and discussed ALL results with the patient, including all abnormal results and incidental findings. I recommended appropriate follow up for all incidental findings. The patient expressed understanding of all results discussed and follow up instructions given.  The patient was discharged from the ED in stable condition. All results of today's workup were discussed with the patient and all questions/concerns were addressed. All discharge instructions were thoroughly discussed with the patient, as well as important warning signs and new/ worsening symptoms which should necessitate patient's immediate return to the ED. The patient is agreeable with discharge and expresses full understanding of all instructions given.

## 2018-11-02 NOTE — ED CDU PROVIDER INITIAL DAY NOTE - DETAILS
RENAL COLIC   -IVF  -PAIN/NAUSEA CONTROL  -FREQ EVAL  -UROLOGY FOLLOWING  -Strain urine  -Flomax  -D/C with BACTRIM   -CASE D/W ATTENDING Dr. Ramires

## 2018-11-02 NOTE — ED CDU PROVIDER INITIAL DAY NOTE - OBJECTIVE STATEMENT
72y/o M with PMH of acromegaly, HTN, HLD, kidney stones, c/o L flank pain x 1 wk. Patient reports general back pain for the past week which signficantly worsened today. Pain is left sided and radiates to L abdomen. pt states the pain feels similar to previous kidney stone. Pain was 10/10, did not try pain medicine at home. Notes having some chills and nausea with pain. Denies any fever, sweats, vomiting, pain or burning with urination, blood in urine, urinary frequency/urgency, pain or swelling in lower extremities, CP, SOB, problems walking.    In ED, UA positive for UTI, otherwise labs unremarkable, CT a/p showed moderate L hydro and stranding with 0.9cm L proximal ureter stone and small nonobstructing intrarenal calculi b/l. urology c/s'd recommend fluids, straining, flomax, bactrim, pain control. pt sent to CDU for uro recs and monitoring.     PMD Dr. Tami Veras ([facility])

## 2018-11-02 NOTE — ED ADULT NURSE REASSESSMENT NOTE - NS ED NURSE REASSESS COMMENT FT1
19:05. Report received from LANCE Hernandez. Pt AAOx4, NAD, resp nonlabored, skin warm/dry, resting comfortably in bed with family at bedside. Pt denies headache, dizziness, chest pain, palpitations, SOB, n/v/d, urinary symptoms, fevers, chills, weakness at this time. Pt awaiting UA results. Safety maintained.
20:30. Pt okay to eat as per MD Rea Gibson
23:03. Pt awaiting to be evaluated by CDU.
BMP done to lab
NS at 125cc/hr urology consult done
Pt sleeping comfortably in stretcher. NAD. Safety & comfort measures maintained. Will continue to reassess.
d/c Amb with steady gait To f/u with urologist Accomp by daughter via wheelchair
urology team aware Creat 1.51
Pt received from LANCE Cordon. Pt oriented to CDU & plan of care was discussed. Pt denies any flank pain at the moment. Pt states when he had the pain it was L flank radiating to L groin. Pt denies any urinary symptoms. Safety & comfort measures maintained. Call bell in reach. Will continue to monitor.

## 2018-11-03 NOTE — ED POST DISCHARGE NOTE - DETAILS
11/3/18: Spoke w/ patient and informed him of urine cx results. States he's feeling well today, pain at home is controlled with medication. Discussed that this ucx could be contaminate, however given that he has known stone and cannot r/o infection advised to continue taking Bactrim as instructed by urology given he's showing improvement. Pt agrees. Denies fever/chills. Has f/u with his urologist on Monday. Appreciative of callback. - Herber Siegel PA-C

## 2021-01-30 ENCOUNTER — INPATIENT (INPATIENT)
Facility: HOSPITAL | Age: 76
LOS: 0 days | Discharge: HOME | DRG: 178 | End: 2021-01-30
Attending: INTERNAL MEDICINE | Admitting: INTERNAL MEDICINE
Payer: COMMERCIAL

## 2021-01-30 ENCOUNTER — TRANSCRIPTION ENCOUNTER (OUTPATIENT)
Age: 76
End: 2021-01-30

## 2021-01-30 VITALS — DIASTOLIC BLOOD PRESSURE: 84 MMHG | SYSTOLIC BLOOD PRESSURE: 124 MMHG

## 2021-01-30 VITALS
OXYGEN SATURATION: 98 % | WEIGHT: 294.98 LBS | HEIGHT: 78 IN | RESPIRATION RATE: 18 BRPM | SYSTOLIC BLOOD PRESSURE: 123 MMHG | HEART RATE: 88 BPM | TEMPERATURE: 98 F | DIASTOLIC BLOOD PRESSURE: 73 MMHG

## 2021-01-30 DIAGNOSIS — E22.0 ACROMEGALY AND PITUITARY GIGANTISM: ICD-10-CM

## 2021-01-30 DIAGNOSIS — E78.5 HYPERLIPIDEMIA, UNSPECIFIED: ICD-10-CM

## 2021-01-30 DIAGNOSIS — R07.9 CHEST PAIN, UNSPECIFIED: ICD-10-CM

## 2021-01-30 DIAGNOSIS — I10 ESSENTIAL (PRIMARY) HYPERTENSION: ICD-10-CM

## 2021-01-30 DIAGNOSIS — N17.9 ACUTE KIDNEY FAILURE, UNSPECIFIED: ICD-10-CM

## 2021-01-30 DIAGNOSIS — U07.1 COVID-19: ICD-10-CM

## 2021-01-30 PROBLEM — N20.0 CALCULUS OF KIDNEY: Chronic | Status: ACTIVE | Noted: 2018-11-02

## 2021-01-30 LAB
ALBUMIN SERPL ELPH-MCNC: 3.8 G/DL — SIGNIFICANT CHANGE UP (ref 3.3–5)
ALP SERPL-CCNC: 54 U/L — SIGNIFICANT CHANGE UP (ref 40–120)
ALT FLD-CCNC: 14 U/L — SIGNIFICANT CHANGE UP (ref 10–45)
ANION GAP SERPL CALC-SCNC: 11 MMOL/L — SIGNIFICANT CHANGE UP (ref 5–17)
AST SERPL-CCNC: 20 U/L — SIGNIFICANT CHANGE UP (ref 10–40)
BASE EXCESS BLDV CALC-SCNC: 4.8 MMOL/L — HIGH (ref -2–2)
BASOPHILS # BLD AUTO: 0.01 K/UL — SIGNIFICANT CHANGE UP (ref 0–0.2)
BASOPHILS NFR BLD AUTO: 0.3 % — SIGNIFICANT CHANGE UP (ref 0–2)
BILIRUB SERPL-MCNC: 1.1 MG/DL — SIGNIFICANT CHANGE UP (ref 0.2–1.2)
BUN SERPL-MCNC: 16 MG/DL — SIGNIFICANT CHANGE UP (ref 7–23)
CA-I SERPL-SCNC: 1.21 MMOL/L — SIGNIFICANT CHANGE UP (ref 1.12–1.3)
CALCIUM SERPL-MCNC: 9.1 MG/DL — SIGNIFICANT CHANGE UP (ref 8.4–10.5)
CHLORIDE BLDV-SCNC: 106 MMOL/L — SIGNIFICANT CHANGE UP (ref 96–108)
CHLORIDE SERPL-SCNC: 100 MMOL/L — SIGNIFICANT CHANGE UP (ref 96–108)
CK MB BLD-MCNC: 1.6 % — SIGNIFICANT CHANGE UP (ref 0–3.5)
CK MB CFR SERPL CALC: 1.9 NG/ML — SIGNIFICANT CHANGE UP (ref 0–6.7)
CK SERPL-CCNC: 120 U/L — SIGNIFICANT CHANGE UP (ref 30–200)
CO2 BLDV-SCNC: 31 MMOL/L — HIGH (ref 22–30)
CO2 SERPL-SCNC: 28 MMOL/L — SIGNIFICANT CHANGE UP (ref 22–31)
CREAT SERPL-MCNC: 1.33 MG/DL — HIGH (ref 0.5–1.3)
EOSINOPHIL # BLD AUTO: 0.01 K/UL — SIGNIFICANT CHANGE UP (ref 0–0.5)
EOSINOPHIL NFR BLD AUTO: 0.3 % — SIGNIFICANT CHANGE UP (ref 0–6)
GAS PNL BLDV: 139 MMOL/L — SIGNIFICANT CHANGE UP (ref 135–145)
GAS PNL BLDV: SIGNIFICANT CHANGE UP
GAS PNL BLDV: SIGNIFICANT CHANGE UP
GLUCOSE BLDV-MCNC: 95 MG/DL — SIGNIFICANT CHANGE UP (ref 70–99)
GLUCOSE SERPL-MCNC: 101 MG/DL — HIGH (ref 70–99)
HCO3 BLDV-SCNC: 30 MMOL/L — HIGH (ref 21–29)
HCT VFR BLD CALC: 41.7 % — SIGNIFICANT CHANGE UP (ref 39–50)
HCT VFR BLDA CALC: 44 % — SIGNIFICANT CHANGE UP (ref 39–50)
HGB BLD CALC-MCNC: 14.3 G/DL — SIGNIFICANT CHANGE UP (ref 13–17)
HGB BLD-MCNC: 13.3 G/DL — SIGNIFICANT CHANGE UP (ref 13–17)
IMM GRANULOCYTES NFR BLD AUTO: 0.5 % — SIGNIFICANT CHANGE UP (ref 0–1.5)
LACTATE BLDV-MCNC: 1.3 MMOL/L — SIGNIFICANT CHANGE UP (ref 0.7–2)
LIDOCAIN IGE QN: 54 U/L — SIGNIFICANT CHANGE UP (ref 7–60)
LYMPHOCYTES # BLD AUTO: 1.12 K/UL — SIGNIFICANT CHANGE UP (ref 1–3.3)
LYMPHOCYTES # BLD AUTO: 29.4 % — SIGNIFICANT CHANGE UP (ref 13–44)
MAGNESIUM SERPL-MCNC: 1.7 MG/DL — SIGNIFICANT CHANGE UP (ref 1.6–2.6)
MCHC RBC-ENTMCNC: 27.3 PG — SIGNIFICANT CHANGE UP (ref 27–34)
MCHC RBC-ENTMCNC: 31.9 GM/DL — LOW (ref 32–36)
MCV RBC AUTO: 85.5 FL — SIGNIFICANT CHANGE UP (ref 80–100)
MONOCYTES # BLD AUTO: 0.45 K/UL — SIGNIFICANT CHANGE UP (ref 0–0.9)
MONOCYTES NFR BLD AUTO: 11.8 % — SIGNIFICANT CHANGE UP (ref 2–14)
NEUTROPHILS # BLD AUTO: 2.2 K/UL — SIGNIFICANT CHANGE UP (ref 1.8–7.4)
NEUTROPHILS NFR BLD AUTO: 57.7 % — SIGNIFICANT CHANGE UP (ref 43–77)
NRBC # BLD: 0 /100 WBCS — SIGNIFICANT CHANGE UP (ref 0–0)
OTHER CELLS CSF MANUAL: 6 ML/DL — LOW (ref 18–22)
PCO2 BLDV: 48 MMHG — SIGNIFICANT CHANGE UP (ref 35–50)
PH BLDV: 7.41 — SIGNIFICANT CHANGE UP (ref 7.35–7.45)
PLATELET # BLD AUTO: 149 K/UL — LOW (ref 150–400)
PO2 BLDV: 20 MMHG — LOW (ref 25–45)
POTASSIUM BLDV-SCNC: 3.6 MMOL/L — SIGNIFICANT CHANGE UP (ref 3.5–5.3)
POTASSIUM SERPL-MCNC: 3.7 MMOL/L — SIGNIFICANT CHANGE UP (ref 3.5–5.3)
POTASSIUM SERPL-SCNC: 3.7 MMOL/L — SIGNIFICANT CHANGE UP (ref 3.5–5.3)
PROT SERPL-MCNC: 7.5 G/DL — SIGNIFICANT CHANGE UP (ref 6–8.3)
RBC # BLD: 4.88 M/UL — SIGNIFICANT CHANGE UP (ref 4.2–5.8)
RBC # FLD: 14.7 % — HIGH (ref 10.3–14.5)
SAO2 % BLDV: 30 % — LOW (ref 67–88)
SARS-COV-2 RNA SPEC QL NAA+PROBE: DETECTED
SODIUM SERPL-SCNC: 139 MMOL/L — SIGNIFICANT CHANGE UP (ref 135–145)
TROPONIN T, HIGH SENSITIVITY RESULT: 21 NG/L — SIGNIFICANT CHANGE UP (ref 0–51)
TROPONIN T, HIGH SENSITIVITY RESULT: 21 NG/L — SIGNIFICANT CHANGE UP (ref 0–51)
WBC # BLD: 3.81 K/UL — SIGNIFICANT CHANGE UP (ref 3.8–10.5)
WBC # FLD AUTO: 3.81 K/UL — SIGNIFICANT CHANGE UP (ref 3.8–10.5)

## 2021-01-30 PROCEDURE — 71045 X-RAY EXAM CHEST 1 VIEW: CPT | Mod: 26

## 2021-01-30 PROCEDURE — 99223 1ST HOSP IP/OBS HIGH 75: CPT | Mod: CS

## 2021-01-30 PROCEDURE — 99223 1ST HOSP IP/OBS HIGH 75: CPT | Mod: CS,AI

## 2021-01-30 PROCEDURE — 93010 ELECTROCARDIOGRAM REPORT: CPT | Mod: GC

## 2021-01-30 PROCEDURE — 99285 EMERGENCY DEPT VISIT HI MDM: CPT | Mod: GC

## 2021-01-30 PROCEDURE — 93308 TTE F-UP OR LMTD: CPT | Mod: 26

## 2021-01-30 RX ORDER — ROSUVASTATIN CALCIUM 5 MG/1
1 TABLET ORAL
Qty: 0 | Refills: 0 | DISCHARGE

## 2021-01-30 RX ORDER — AMLODIPINE BESYLATE 2.5 MG/1
1 TABLET ORAL
Qty: 0 | Refills: 0 | DISCHARGE

## 2021-01-30 RX ORDER — ATORVASTATIN CALCIUM 80 MG/1
80 TABLET, FILM COATED ORAL AT BEDTIME
Refills: 0 | Status: DISCONTINUED | OUTPATIENT
Start: 2021-01-30 | End: 2021-01-30

## 2021-01-30 RX ORDER — ICOSAPENT ETHYL 500 MG/1
2 CAPSULE, LIQUID FILLED ORAL
Qty: 0 | Refills: 0 | DISCHARGE

## 2021-01-30 RX ORDER — TAMSULOSIN HYDROCHLORIDE 0.4 MG/1
0.4 CAPSULE ORAL AT BEDTIME
Refills: 0 | Status: DISCONTINUED | OUTPATIENT
Start: 2021-01-30 | End: 2021-01-30

## 2021-01-30 RX ORDER — LOSARTAN POTASSIUM 100 MG/1
1 TABLET, FILM COATED ORAL
Qty: 0 | Refills: 0 | DISCHARGE

## 2021-01-30 RX ORDER — FAMOTIDINE 10 MG/ML
20 INJECTION INTRAVENOUS ONCE
Refills: 0 | Status: COMPLETED | OUTPATIENT
Start: 2021-01-30 | End: 2021-01-30

## 2021-01-30 RX ORDER — SODIUM CHLORIDE 9 MG/ML
250 INJECTION INTRAMUSCULAR; INTRAVENOUS; SUBCUTANEOUS ONCE
Refills: 0 | Status: COMPLETED | OUTPATIENT
Start: 2021-01-30 | End: 2021-01-30

## 2021-01-30 RX ORDER — AMLODIPINE BESYLATE 2.5 MG/1
2.5 TABLET ORAL DAILY
Refills: 0 | Status: DISCONTINUED | OUTPATIENT
Start: 2021-01-30 | End: 2021-01-30

## 2021-01-30 RX ORDER — CABERGOLINE 0.5 MG/1
2 TABLET ORAL
Qty: 0 | Refills: 0 | DISCHARGE

## 2021-01-30 RX ORDER — PANTOPRAZOLE SODIUM 20 MG/1
40 TABLET, DELAYED RELEASE ORAL
Refills: 0 | Status: DISCONTINUED | OUTPATIENT
Start: 2021-01-30 | End: 2021-01-30

## 2021-01-30 RX ORDER — OMEPRAZOLE 10 MG/1
1 CAPSULE, DELAYED RELEASE ORAL
Qty: 0 | Refills: 0 | DISCHARGE

## 2021-01-30 RX ORDER — TAMSULOSIN HYDROCHLORIDE 0.4 MG/1
1 CAPSULE ORAL
Qty: 0 | Refills: 0 | DISCHARGE
Start: 2021-01-30

## 2021-01-30 RX ORDER — LOSARTAN POTASSIUM 100 MG/1
100 TABLET, FILM COATED ORAL DAILY
Refills: 0 | Status: DISCONTINUED | OUTPATIENT
Start: 2021-01-30 | End: 2021-01-30

## 2021-01-30 RX ADMIN — PANTOPRAZOLE SODIUM 40 MILLIGRAM(S): 20 TABLET, DELAYED RELEASE ORAL at 11:14

## 2021-01-30 RX ADMIN — FAMOTIDINE 20 MILLIGRAM(S): 10 INJECTION INTRAVENOUS at 08:42

## 2021-01-30 RX ADMIN — LOSARTAN POTASSIUM 100 MILLIGRAM(S): 100 TABLET, FILM COATED ORAL at 11:14

## 2021-01-30 RX ADMIN — SODIUM CHLORIDE 500 MILLILITER(S): 9 INJECTION INTRAMUSCULAR; INTRAVENOUS; SUBCUTANEOUS at 11:14

## 2021-01-30 RX ADMIN — AMLODIPINE BESYLATE 2.5 MILLIGRAM(S): 2.5 TABLET ORAL at 11:14

## 2021-01-30 NOTE — ED ADULT NURSE NOTE - OBJECTIVE STATEMENT
Pt is a 75y Male c/o CP starting at 0200 today while the pt was watching TV. Pt states pain is similar to GERD.  Pt PMHx HTN, GERD. Pt states he was recently dx with GERD by his GI, but was told he needed a stress test to evaluate his heart. Pt has not gone for stress test yet. Pt states the pain comes and goes lasted for approximately 2 minutes although the initial discomfort lasted 20 minutes. Pt became diaphoretic which caused him to call EMS. Pt took 4 x 91 mg aspirin from EMS. Pt describes the discomfort as needing to belch. Pt denies SOB, NVD, cough, sick contacts. Pt prefers to go by Miles and uses He/Him/His pronouns. Pt resting comfortably in bed with MD and EMS at bedside. Pt educated on call bell use and call bell placed at bedside. Pt safety maintained.

## 2021-01-30 NOTE — DISCHARGE NOTE PROVIDER - CARE PROVIDER_API CALL
Rajinder Albright)  Cardiovascular Disease; Internal Medicine  [address] [place] [address], [address] [place] [address]  Phone: [phone]  Fax: [phone]  Follow Up Time:    Rajinder Albright)  Cardiovascular Disease; Internal Medicine  [facility], [address] [place] [address]  Phone: [phone]  Fax: [phone]  Follow Up Time:     urology,   out patient urology as per your PMD  Phone: (   )    -  Fax: (   )    -  Follow Up Time:

## 2021-01-30 NOTE — ED PROVIDER NOTE - CLINICAL SUMMARY MEDICAL DECISION MAKING FREE TEXT BOX
Kasi PGY-3:  74yo M w/ hx HTN here w/ CP, significantly abnormal ekg in this elderly M concerning for cardiac etiology of CP, d/w cardiology states not STEMI. Will obtain bloodwork including troponin and reassess.   no pleuritic cp no sob no PE RF doubt PE, pain not going to back and currantly not in pain BL equal radial pulses doubt dissection.,

## 2021-01-30 NOTE — ED PROVIDER NOTE - NS ED ROS FT
CONSTITUTIONAL: No fevers, no chills  Eyes: No vision changes  Cardiovascular: refer to HPI  Respiratory: No SOB  Gastrointestinal: No n/v/d, no abd pain  Genitourinary: no dysuria, no hematuria  SKIN: no rashes.  NEURO: no headache, no weakness or numbness  PSYCHIATRIC: no known mental health issues.  Endocrine: No unexplained weight gain

## 2021-01-30 NOTE — DISCHARGE NOTE PROVIDER - CARE PROVIDERS DIRECT ADDRESSES
,[email or web address],DirectAddress_Unknown

## 2021-01-30 NOTE — H&P ADULT - PROBLEM SELECTOR PLAN 5
awaiting CXR  no sxs  + 1/30  on room air  not a candidate for remdisvir or dex given the above  maintain airborne iso c/w home meds

## 2021-01-30 NOTE — DISCHARGE NOTE PROVIDER - NSDCMRMEDTOKEN_GEN_ALL_CORE_FT
amLODIPine 2.5 mg oral tablet: 1 tab(s) orally once a day  Bactrim  mg-160 mg oral tablet: 1 tab(s) orally 2 times a day     Note:no record with pharmacy and family unsure of meds  cabergoline 0.5 mg oral tablet: 2 tab(s) orally once a week, As Needed  Crestor 20 mg oral tablet: 1 tab(s) orally once a day alternating with 1.5 tab    Note:Family unsure of meds unable to speakwith pt  Flomax 0.4 mg oral capsule: 1 cap(s) orally once a day (at bedtime)     Note:no record with pharmacy and family unsure of meds  losartan 100 mg oral tablet: 1 tab(s) orally once a day    Note: family unsure of meds  omeprazole 40 mg oral delayed release capsule: 1 cap(s) orally once a day  Percocet 5/325 oral tablet: 1 tab(s) orally every 6 hours as needed for severe pain. MDD:4 DO NOT DRIVE, DRINK ALCOHOL OR OPERATE MACHINERY WHILE TAKING    Note:no record with pharmacy and family unsure of meds  Vascepa 1 g oral capsule: 2 cap(s) orally 2 times a day   amLODIPine 2.5 mg oral tablet: 1 tab(s) orally once a day  Bactrim  mg-160 mg oral tablet: 1 tab(s) orally 2 times a day     Note:no record with pharmacy and family unsure of meds  cabergoline 0.5 mg oral tablet: 2 tab(s) orally once a week, As Needed  Crestor 20 mg oral tablet: 1 tab(s) orally once a day alternating with 1.5 tab    Note:Family unsure of meds unable to speakwith pt  losartan 100 mg oral tablet: 1 tab(s) orally once a day    Note: family unsure of meds  omeprazole 40 mg oral delayed release capsule: 1 cap(s) orally once a day  Percocet 5/325 oral tablet: 1 tab(s) orally every 6 hours as needed for severe pain. MDD:4 DO NOT DRIVE, DRINK ALCOHOL OR OPERATE MACHINERY WHILE TAKING    Note:no record with pharmacy and family unsure of meds  Vascepa 1 g oral capsule: 2 cap(s) orally 2 times a day

## 2021-01-30 NOTE — ED PROVIDER NOTE - NS_EDPROVIDERDISPOUSERTYPE_ED_A_ED
Mother reports evaluated at urgi center today for possible hemorrhoid. Mother reports hard stool this am and after felt a bump above her anal area. Referred to ed to r/o anal fissure with swelling.
Attending Attestation (For Attendings USE Only)...

## 2021-01-30 NOTE — ED ADULT NURSE NOTE - ED STAT RN HANDOFF DETAILS
Report given to LANCE Schilling. Pt COVID and awaiting bed for admission. RN informed of abnormal EKG.

## 2021-01-30 NOTE — DISCHARGE NOTE PROVIDER - HOSPITAL COURSE
4yo M hx HTN, acromegaly presents w/ CP        ·  : Chest pain in adult.  Plan: atypical CP  trop 21, second one pending,  cards consulted,  clear for discharge   may need inpatient 2d echo, will follow up  pepcid for possible indigestion  chest tpain free at this time  if trop flat will dc tele.      Problem/Plan - 2:  ·  Problem: JAQUAN (acute kidney injury).  Plan: vs CKD  unclear baseline  monitor  will give some small fluid bolus. improved       ·   Acromegaly. Plan: pt takes cabergoline weekly but unable to clairfy when he takes it  follow up with family, no answer will conform and d/c home        ·  : HTN (hypertension). Plan: c/w home meds.        ·  : HLD (hyperlipidemia). Plan: c/w home meds.         Problem: 2019 novel coronavirus disease (COVID-19).   + 1/30  on room air  not a candidate for remdisvir or dex given the above  maintain airborne iso.       above plan discussed with attending  Aaliyah Franco        6yo M hx HTN, acromegaly presents w/ CP        ·  : Chest pain in adult.  Plan: atypical CP  trop 21, second one pending,  cards consulted,  clear for discharge   may need inpatient 2d echo, will follow up  pepcid for possible indigestion  chest tpain free at this time  if trop flat will dc tele.        ·  Problem: JAQUAN (acute kidney injury).  Plan: vs CKD  unclear baseline  monitor  will give some small fluid bolus. improved  possible BPH f/u out patient urology       ·   Acromegaly. Plan: pt takes cabergoline weekly but unable to clairfy when he takes it  follow up with family, no answer will conform and d/c home        ·  : HTN (hypertension). Plan: c/w home meds.        ·  : HLD (hyperlipidemia). Plan: c/w home meds.         Problem: 2019 novel coronavirus disease (COVID-19).   + 1/30  on room air  not a candidate for remdisvir or dex given the above  maintain airborne iso.       above plan discussed with attending  Aaliyah Franco        76yo M hx HTN, acromegaly presents w/ CP        ·  : Chest pain in adult.  Plan: atypical CP  trop 21, second one pending,  cards consulted,  clear for discharge   may need inpatient 2d echo, will follow up  pepcid for possible indigestion  chest tpain free at this time  if trop flat will dc tele.        ·  Problem: JAQUAN (acute kidney injury).  Plan: vs CKD  unclear baseline  monitor  will give some small fluid bolus. improved  possible BPH f/u out patient urology       ·   Acromegaly. Plan: pt takes cabergoline weekly but unable to clairfy when he takes it  follow up with family, no answer will conform and d/c home        ·  : HTN (hypertension). Plan: c/w home meds.        ·  : HLD (hyperlipidemia). Plan: c/w home meds.         Problem: 2019 novel coronavirus disease (COVID-19).   + 1/30  on room air  not a candidate for remdisvir or dex given the above  maintain airborne iso.       above plan discussed with attending  Aaliyah Franco

## 2021-01-30 NOTE — H&P ADULT - NSHPLABSRESULTS_GEN_ALL_CORE
13.3   3.81  )-----------( 149      ( 30 Jan 2021 05:57 )             41.7     01-30    139  |  100  |  16  ----------------------------<  101<H>  3.7   |  28  |  1.33<H>    Ca    9.1      30 Jan 2021 05:57  Mg     1.7     01-30    TPro  7.5  /  Alb  3.8  /  TBili  1.1  /  DBili  x   /  AST  20  /  ALT  14  /  AlkPhos  54  01-30      ekg sinus rhythm; 1st degree AVB; LAFB, incomplete RBBB      covid +

## 2021-01-30 NOTE — DISCHARGE NOTE PROVIDER - PROVIDER TOKENS
PROVIDER:[TOKEN:[[id number]]] PROVIDER:[TOKEN:[[id number]]],FREE:[LAST:[urology],PHONE:[(   )    -],FAX:[(   )    -],ADDRESS:[out patient urology as per your PMD]]

## 2021-01-30 NOTE — CONSULT NOTE ADULT - ASSESSMENT
76yo M hx HTN, acromegaly presents w/ CP at 2AM assoc w/ belching    Plan  -unclear etiology; however overall sx concerning for viral infection, possibly 2/2 COVID, rather than ACS; ECG w/o ST derangements concerning for acute ischemia  -trops neg x 1; can repeat an additional set and ECG  -f/u CXR  -pepcid for indigestion    Suraj Kent MD  Cardiology Fellow - F1  Text or Call: [phone]  For all New Consults and Questions:  [email or web address]   Login: [email or web address]

## 2021-01-30 NOTE — ED PROVIDER NOTE - ATTENDING CONTRIBUTION TO CARE
Attending Statement (LAMONT Hamilton MD):    HPI: 76y/o M with h/o HTN, Acromegaly, HLD, nephrolithiasis, presenting with c/o chest pain that began around 2AM while watching tv; felt lightheaded, sweaty; lasting several minutes, recurred multiple times; had some pain at present on initial evaluation; no cough no shortness of breath; does report some body aches but no fever/chills.  Took 4 [id number] and called EMS.    Review of Systems:  -General: no fever or chills; + body aches  -ENT: no congestion, no difficulty swallowing  -Pulmonary: no cough, no shortness of breath  -Cardiac: + chest pain, no palpitations  -Gastrointestinal: no abdominal pain, no nausea, no vomiting, and no diarrhea.  -Genitourinary: no blood or pain with urination  -Musculoskeletal: no back or neck pain  -Skin: no rashes  -Endocrine: No h/o diabetes or thyroid disease  -Neurologic: No focal weakness or numbness    All else negative unless otherwise specified elsewhere in this note.    PSH/PMH as noted above    On Physical Exam:  General: well appearing, in NAD, speaking clearly in full sentences and without difficulty; cooperative with exam  HEENT: PERRL, MMM  Neck: no neck tenderness, no nuchal rigidity  Cardiac: normal s1, s2; RRR; no MGR  Lungs: CTABL  Abdomen: soft nontender/nondistended  : no bladder tenderness or distension  Skin: intact, no rash  Extremities: no peripheral edema, no gross deformities  Neuro: no gross neurologic deficits    MDM: Pt presenting with chest pain, concerning for ACS - will obtain ECG, CXR, and labs (CBC/CMP/Cardiac Enzymes). Patient's description of chest pain, including lack of pleuritic nature, minimal risk factors and overall clinical picture are not consistent with a pulmonary embolism. The patient's clinical presentation, including type/description of pain, stable vitals and overall clinical picture are not consistent with an acute aortic dissection. Afebrile, low suspicion for acute infectious etiology; will obtain cxr and covid pcr (given ongoing pandemic with community spread).

## 2021-01-30 NOTE — DISCHARGE NOTE NURSING/CASE MANAGEMENT/SOCIAL WORK - PATIENT PORTAL LINK FT
You can access the FollowMyHealth Patient Portal offered by [facility] by registering at the following website: [email or web address]. By joining [facility]’s FollowMyHealth portal, you will also be able to view your health information using other applications (apps) compatible with our system.

## 2021-01-30 NOTE — ED PROVIDER NOTE - PROGRESS NOTE DETAILS
Attending note (Alfonso): CP now resolved; cardiology fellow evaluated and states that there is no stemi, no indication for emergent catheterization. Kasi PGY-3:  D/W pt benefits/risks of admission in context of CP with Covid, states would prefer to stay, continues to have pain, will admit for cardiac workup

## 2021-01-30 NOTE — DISCHARGE NOTE PROVIDER - NSDCCPCAREPLAN_GEN_ALL_CORE_FT
PRINCIPAL DISCHARGE DIAGNOSIS  Diagnosis: Shortness of breath with exposure to COVID-19 virus  Assessment and Plan of Treatment: You were found to have novel coronavirus (COVID-19).   - Continue to QUARANTINE yourself and any family members you may live with   -Please follow up with your PCP in 1-2 weeks   -Call your Provider before hand to make then aware of your hospitalization   -Take Tylenol for Fevers every 6 hours as needed- Do not exceed 4gm of Tylenol in a 24 hour period  -Monitor your symptoms. Call your PCP or come to hospital if symptoms worsening   -Stay hydrated   -WEAR A FACE MASK   -Cover your cough and sneezes   -Clean your hands often   -Avoid sharing personal house hold items   -Clean all high touch surfaces- everyday items like table tops , door knobs, cell phones etc   -You should restrict activities outside your home except for getting medical care   -Avoid using public transportation  -Do not go to work, school, or Public areas   -Monitor your oxygen saturation   -Call [facility] at [phone]  -Nutrition is important, eat small frequent meals.  -Get lots of rest and drink fluids.  -If your cough worsens, you develop fever greater than 101', you have shaking chills, a fast heartbeat, trouble breathing and/or feel your are breathing much faster than usual, call your healthcare provider.  Make sure you wash your hands frequently.

## 2021-01-30 NOTE — H&P ADULT - HISTORY OF PRESENT ILLNESS
76yo M hx HTN, acromegaly pw cc of substernal chest pain at 2am this morning while watching tv. Felt heaviness, denies radiation to be arm, jaw or back, lasted for about 15 minutes and associated with some diaphoresis. Took 4 aspirin and came to the ED. Chest pain free since then.

## 2021-01-30 NOTE — H&P ADULT - NSICDXPASTMEDICALHX_GEN_ALL_CORE_FT
PAST MEDICAL HISTORY:  Acromegaly     HLD (hyperlipidemia)     HTN (hypertension)     Nephrolithiasis

## 2021-01-30 NOTE — H&P ADULT - PROBLEM SELECTOR PLAN 1
atypical CP  trop 21, second one pending,  cards consulted, f/u recs  may need inpatient 2d echo, will follow up  pepcid for possible indigestion  ches tpain free at this time  if trop flat will dc tele

## 2021-01-30 NOTE — CONSULT NOTE ADULT - SUBJECTIVE AND OBJECTIVE BOX
Patient seen and evaluated at bedside    Chief Complaint:    HPI: 74yo M hx HTN, acromegaly presents w/ CP since 2AM. Reports myalgias and malaise x 2days concerning for the flu. CP assoc w/ burping at 2AM unresolving prompting ED presentation.     In the ED, VS T: 98.2, P: 88, BP: 123/73, RR: 18, O2: 98%RA. Patient currently denying CP, dyspnea, palpitations, presyncope, syncope, HA, ab pain.    PMHx:   Nephrolithiasis    HLD (hyperlipidemia)    HTN (hypertension)    Acromegaly      PSHx:   No significant past surgical history      FAMILY HISTORY:  Family history of breast cancer (Mother)      Allergies:  No Known Allergies    Home Medications:  Crestor 10 mg oral tablet: 1 tab(s) orally once a day (at bedtime) (02 Nov 2018 02:51)  losartan 25 mg oral tablet: 1 tab(s) orally once a day (02 Nov 2018 02:51)    Current Medications:   famotidine Injectable 20 milliGRAM(s) IV Push once    Social History  Smoking History: denies  Alcohol Use: denies  Drug Use: denies    REVIEW OF SYSTEMS:  Constitutional:     [X] negative [ ] fevers [ ] chills [ ] weight loss [ ] weight gain  HEENT:                  [X] negative [ ] dry eyes [ ] eye irritation [ ] postnasal drip [ ] nasal congestion  CV:                         [ ] negative  [X] chest pain [ ] orthopnea [ ] palpitations [ ] murmur  Resp:                     [X] negative [ ] cough [ ] shortness of breath [ ] dyspnea [ ] wheezing [ ] sputum [ ] hemoptysis  GI:                          [X] negative [ ] nausea [ ] vomiting [ ] diarrhea [ ] constipation [ ] abd pain [ ] dysphagia   :                        [X] negative [ ] dysuria [ ] nocturia [ ] hematuria [ ] increased urinary frequency  MSK:                      [ ] negative [ ] back pain [X] myalgias [ ] arthralgias [ ] fracture  Skin:                       [X] negative [ ] rash [ ] itch  Neuro:                   [X] negative [ ] headache [ ] dizziness [ ] syncope [ ] weakness [ ] numbness  Psych:                    [X] negative [ ] anxiety [ ] depression  Endo:                     [X] negative [ ] diabetes [ ] thyroid problem  Heme/Lymph:      [X] negative [ ] anemia [ ] bleeding problem  Allergic/Immune: [X] negative [ ] itchy eyes [ ] nasal discharge [ ] hives [ ] angioedema    [X] All other systems negative or otherwise described above.  [ ] Unable to assess ROS because ________.    ICU Vital Signs Last 24 Hrs  T(C): 36.7 (30 Jan 2021 05:52), Max: 36.8 (30 Jan 2021 05:23)  T(F): 98.1 (30 Jan 2021 05:52), Max: 98.2 (30 Jan 2021 05:23)  HR: 79 (30 Jan 2021 05:52) (79 - 88)  BP: 112/78 (30 Jan 2021 05:52) (112/78 - 123/73)  BP(mean): 91 (30 Jan 2021 05:52) (91 - 91)  ABP: --  ABP(mean): --  RR: 18 (30 Jan 2021 05:52) (18 - 18)  SpO2: 100% (30 Jan 2021 05:52) (98% - 100%)    Daily Height in cm: 198.12 (30 Jan 2021 05:23)    Daily     Physical Exam:  GENERAL: No acute distress, well-developed  HEAD:  Atraumatic, Normocephalic  ENT: EOMI, conjunctiva and sclera clear, Neck supple, No JVD, moist mucosa  CHEST/LUNG: Clear to auscultation bilaterally; No wheeze, equal breath sounds bilaterally   BACK: No spinal tenderness  HEART: Regular rate and rhythm; No murmurs, rubs, or gallops, radial and DP 2+ b/l, euvolemic  ABDOMEN: Soft, Nontender, Nondistended  EXTREMITIES:  No clubbing, cyanosis, or edema  PSYCH: Nl behavior, nl affect  NEUROLOGY: AAOx3, non-focal  SKIN: Normal color, No rashes or lesions  LINES: no central lines present    Cardiovascular Diagnostic Testing    ECG: Personally reviewed; sinus rhythm; 1st degree AVB; LAFB, incomplete RBBB    Echo: none    Stress Testing: none    Cath: none    Imaging: none    CXR: Personally reviewed    Labs: Personally reviewed                        13.3   3.81  )-----------( 149      ( 30 Jan 2021 05:57 )             41.7     01-30    139  |  100  |  16  ----------------------------<  101<H>  3.7   |  28  |  1.33<H>    Ca    9.1      30 Jan 2021 05:57  Mg     1.7     01-30    TPro  7.5  /  Alb  3.8  /  TBili  1.1  /  DBili  x   /  AST  20  /  ALT  14  /  AlkPhos  54  01-30

## 2021-01-30 NOTE — H&P ADULT - PROBLEM SELECTOR PLAN 3
c/w home meds pt takes cabergoline weekly but unable to clairfy when he takes it  follow up with family, no answer

## 2021-01-30 NOTE — H&P ADULT - PROBLEM SELECTOR PLAN 6
awaiting CXR  no sxs  + 1/30  on room air  not a candidate for remdisvir or dex given the above  maintain airborne iso

## 2021-01-30 NOTE — H&P ADULT - NSHPPHYSICALEXAM_GEN_ALL_CORE
Vital Signs Last 24 Hrs  T(C): 37 (30 Jan 2021 08:21), Max: 37 (30 Jan 2021 08:21)  T(F): 98.6 (30 Jan 2021 08:21), Max: 98.6 (30 Jan 2021 08:21)  HR: 74 (30 Jan 2021 08:21) (72 - 88)  BP: 105/77 (30 Jan 2021 08:21) (105/77 - 123/73)  BP(mean): 91 (30 Jan 2021 05:52) (91 - 91)  RR: 18 (30 Jan 2021 08:21) (17 - 18)  SpO2: 98% (30 Jan 2021 08:21) (98% - 100%)  CAPILLARY BLOOD GLUCOSE        I&O's Summary      PHYSICAL EXAM:  GENERAL: NAD, well-developed  HEAD:  Atraumatic, Normocephalic  EYES: EOMI, PERRL, conjunctiva and sclera clear  CHEST/LUNG: Clear to auscultation bilaterally; No wheeze  HEART: Regular rate and rhythm; No murmurs, rubs, or gallops  ABDOMEN: Soft, Nontender, Nondistended; Bowel sounds present  EXTREMITIES:  2+ Peripheral Pulses, No clubbing, cyanosis, or edema  NEUROLOGY: AAOx3, non-focal  SKIN: No rashes or lesions

## 2021-01-31 ENCOUNTER — TRANSCRIPTION ENCOUNTER (OUTPATIENT)
Age: 76
End: 2021-01-31

## 2021-02-02 ENCOUNTER — TRANSCRIPTION ENCOUNTER (OUTPATIENT)
Age: 76
End: 2021-02-02

## 2021-02-02 ENCOUNTER — APPOINTMENT (OUTPATIENT)
Dept: DISASTER EMERGENCY | Facility: HOSPITAL | Age: 76
End: 2021-02-02

## 2021-02-04 ENCOUNTER — APPOINTMENT (OUTPATIENT)
Dept: DISASTER EMERGENCY | Facility: HOSPITAL | Age: 76
End: 2021-02-04

## 2021-02-04 ENCOUNTER — OUTPATIENT (OUTPATIENT)
Dept: INPATIENT UNIT | Facility: HOSPITAL | Age: 76
LOS: 1 days | End: 2021-02-04
Payer: MEDICARE

## 2021-02-04 VITALS
HEIGHT: 78 IN | RESPIRATION RATE: 18 BRPM | WEIGHT: 281.09 LBS | HEART RATE: 85 BPM | SYSTOLIC BLOOD PRESSURE: 91 MMHG | TEMPERATURE: 98 F | DIASTOLIC BLOOD PRESSURE: 52 MMHG | OXYGEN SATURATION: 93 %

## 2021-02-04 VITALS
OXYGEN SATURATION: 96 % | RESPIRATION RATE: 16 BRPM | TEMPERATURE: 97 F | HEART RATE: 81 BPM | SYSTOLIC BLOOD PRESSURE: 96 MMHG | DIASTOLIC BLOOD PRESSURE: 69 MMHG

## 2021-02-04 DIAGNOSIS — U07.1 COVID-19: ICD-10-CM

## 2021-02-04 PROCEDURE — M0239: CPT

## 2021-02-04 RX ORDER — BAMLANIVIMAB 35 MG/ML
700 INJECTION, SOLUTION INTRAVENOUS ONCE
Refills: 0 | Status: COMPLETED | OUTPATIENT
Start: 2021-02-04 | End: 2021-02-04

## 2021-02-04 RX ORDER — SODIUM CHLORIDE 9 MG/ML
200 INJECTION INTRAMUSCULAR; INTRAVENOUS; SUBCUTANEOUS
Refills: 0 | Status: DISCONTINUED | OUTPATIENT
Start: 2021-02-04 | End: 2021-02-18

## 2021-02-04 RX ORDER — SODIUM CHLORIDE 9 MG/ML
250 INJECTION INTRAMUSCULAR; INTRAVENOUS; SUBCUTANEOUS
Refills: 0 | Status: DISCONTINUED | OUTPATIENT
Start: 2021-02-04 | End: 2021-02-18

## 2021-02-04 RX ADMIN — BAMLANIVIMAB 270 MILLIGRAM(S): 35 INJECTION, SOLUTION INTRAVENOUS at 10:15

## 2021-02-04 RX ADMIN — SODIUM CHLORIDE 25 MILLILITER(S): 9 INJECTION INTRAMUSCULAR; INTRAVENOUS; SUBCUTANEOUS at 10:16

## 2021-02-04 RX ADMIN — SODIUM CHLORIDE 100 MILLILITER(S): 9 INJECTION INTRAMUSCULAR; INTRAVENOUS; SUBCUTANEOUS at 10:24

## 2021-02-04 NOTE — CHART NOTE - NSCHARTNOTEFT_GEN_A_CORE
I have reviewed the Bamlanivimab Emergency Use Authorization (EAU) and I have provided the patient or patient's caregiver with the following information:  1. FDA has authorized emergency use of Bamlanivimab, which is not FDA-approved biologic product.  2. The patient or patient's caregiver has the option to accept or refuse administration of Bamlanivimab.  3. The significant known and benefits are unknown.  4. Information on available alternative treatments and risks and benefits of those alternatives.    Discharge:  T(C): 36.3 (02-04-21 @ 11:15), Max: 36.9 (02-04-21 @ 10:30)  HR: 81 (02-04-21 @ 11:15) (80 - 93)  BP: 96/69 (02-04-21 @ 11:15) (86/62 - 102/70)  RR: 16 (02-04-21 @ 11:15) (16 - 18)  SpO2: 96% (02-04-21 @ 11:15) (92% - 96%)  Patient tolerated infusion well denies complaints of chest pain/SOB/dizzines/ palps  VSS for discharge home  D/C instructions given/ fact sheet included.  Patient to follow-up with PCP as needed.
CC: Monoclonal Antibody Infusion/COVID 19 Positive  75yMale sttes he presented to ER 1/30 with chest congestion/indigestion and body aches "unsure if I was having a heart attack" Cardiac workup negative however COVID screening was +  referred for MCAB infusion    exam/findings:  T(C): 36.7 (02-04-21 @ 10:11), Max: 36.8 (02-04-21 @ 09:55)  HR: 93 (02-04-21 @ 10:11) (85 - 93)  BP: 86/62 (02-04-21 @ 10:11) (86/62 - 91/52)  RR: 16 (02-04-21 @ 10:11) (16 - 18)  SpO2: 92% (02-04-21 @ 10:11) (92% - 93%)      PE:   Appearance: NAD	  HEENT:   Normal oral mucosa,   Lymphatic: No lymphadenopathy  Cardiovascular: Normal S1 S2, No JVD, No murmurs, No edema  Respiratory: Lungs clear to auscultation	  Gastrointestinal:  Soft, Non-tender, + BS	  Skin: warm and dry  Neurologic: Non-focal  Extremities: Normal range of motion,    ASSESSMENT:  Pt is a   75 year old male Covid +  referred by PCP who presents to infusion center for Monoclonal antibody infusion (Bamlanivimab)  Symptoms/ Criteria: malaise, rhinitis  Risk Profile includes: age > 75 years    PLAN:  - infusion procedure explained to patient   -Consent for monoclonal antibody infusion obtained   - Risk & benefits discussed/all questions answered  -infuse  Bamlanivimab 700mg  IV over one hour   -observe patient for one hour post infusion    Post infusion   Patient tolerated infusion well, denies complaints of chest pain,SOB,dizziness,palpitations  Vital signs remain stable for discharge home  D/C instructions given / fact sheet reviewed and included with discharge paperwork  Pt verbalizes to seek medical attention if needed  To follow with PMD w/in 1 week

## 2021-02-05 ENCOUNTER — TRANSCRIPTION ENCOUNTER (OUTPATIENT)
Age: 76
End: 2021-02-05

## 2021-02-09 ENCOUNTER — TRANSCRIPTION ENCOUNTER (OUTPATIENT)
Age: 76
End: 2021-02-09

## 2021-02-11 NOTE — ED PROVIDER NOTE - OBJECTIVE STATEMENT
Care Transition Team Discharge Planning    Anticipated Discharge Disposition: on legal hold vs d/c to SNF     Action: Lsw received IPCSS indicating pt is SI/HI from Dr Colon completed yesterday. Lsw received order for SNF from Dr Colon ordered today.    Lsw sent text to Dr Colon requesting direction: either f/u w/ Legal Hold or f/u w/ SNF referral, etc.      Barriers to Discharge: TBD    Plan: f/u w/ Dr Colon, medical team, Caprice MOTTA w/ Legal Holds     76yo M hx HTN, acromegaly pw cc of CP    States at 2am this morning while watching tv had onset of significant CP not radiating to neck or arms or back. Lasted 15 minutes, concerned pt enough to come to ED, states since then has had repeated episodes of CP lasting seconds-minutes. Currently with no CP. Received 4x aspirin by EMS.  States previous cardiac workup was cardiac catheterization 10 years ago. No cardiac hx. Has had "body aches" for a few days, however No F/C, no cough, no n/v/d.       Cardiologist: Emanuel Licona PCP 74yo M hx HTN, acromegaly pw cc of CP    States at 2am this morning while watching tv had onset of significant CP not radiating to neck or arms or back. Lasted 15 minutes, concerned pt enough to come to ED, states since then has had repeated episodes of CP lasting seconds-minutes. Currently with no CP. Received 4x aspirin by EMS.  States previous cardiac workup was cardiac catheterization 10 years ago. No cardiac hx. Has had "body aches" for a few days, however No F/C, no cough, no n/v/d.       Cardiologist: Emanuel Veras PCP [facility]

## 2021-03-16 PROCEDURE — 84484 ASSAY OF TROPONIN QUANT: CPT

## 2021-03-16 PROCEDURE — 85018 HEMOGLOBIN: CPT

## 2021-03-16 PROCEDURE — 99199 UNLISTED SPECIAL SVC PX/RPRT: CPT

## 2021-03-16 PROCEDURE — 85025 COMPLETE CBC W/AUTO DIFF WBC: CPT

## 2021-03-16 PROCEDURE — 82947 ASSAY GLUCOSE BLOOD QUANT: CPT

## 2021-03-16 PROCEDURE — 85014 HEMATOCRIT: CPT

## 2021-03-16 PROCEDURE — 83605 ASSAY OF LACTIC ACID: CPT

## 2021-03-16 PROCEDURE — 83735 ASSAY OF MAGNESIUM: CPT

## 2021-03-16 PROCEDURE — 84132 ASSAY OF SERUM POTASSIUM: CPT

## 2021-03-16 PROCEDURE — 82803 BLOOD GASES ANY COMBINATION: CPT

## 2021-03-16 PROCEDURE — U0003: CPT

## 2021-03-16 PROCEDURE — 84295 ASSAY OF SERUM SODIUM: CPT

## 2021-03-16 PROCEDURE — 93005 ELECTROCARDIOGRAM TRACING: CPT

## 2021-03-16 PROCEDURE — U0005: CPT

## 2021-03-16 PROCEDURE — 80053 COMPREHEN METABOLIC PANEL: CPT

## 2021-03-16 PROCEDURE — 82330 ASSAY OF CALCIUM: CPT

## 2021-03-16 PROCEDURE — 83690 ASSAY OF LIPASE: CPT

## 2021-03-16 PROCEDURE — 93308 TTE F-UP OR LMTD: CPT

## 2021-03-16 PROCEDURE — 99285 EMERGENCY DEPT VISIT HI MDM: CPT | Mod: 25

## 2021-03-16 PROCEDURE — 82553 CREATINE MB FRACTION: CPT

## 2021-03-16 PROCEDURE — 71045 X-RAY EXAM CHEST 1 VIEW: CPT

## 2021-03-16 PROCEDURE — 82550 ASSAY OF CK (CPK): CPT

## 2021-03-16 PROCEDURE — 82435 ASSAY OF BLOOD CHLORIDE: CPT

## 2022-11-09 ENCOUNTER — EMERGENCY (EMERGENCY)
Facility: HOSPITAL | Age: 77
LOS: 1 days | Discharge: HOME | End: 2022-11-09
Attending: EMERGENCY MEDICINE
Payer: MEDICARE

## 2022-11-09 VITALS
WEIGHT: 274.92 LBS | RESPIRATION RATE: 16 BRPM | HEIGHT: 78 IN | TEMPERATURE: 98 F | SYSTOLIC BLOOD PRESSURE: 134 MMHG | DIASTOLIC BLOOD PRESSURE: 79 MMHG | HEART RATE: 69 BPM

## 2022-11-09 PROCEDURE — 99284 EMERGENCY DEPT VISIT MOD MDM: CPT | Mod: CS

## 2022-11-09 RX ORDER — ASPIRIN/CALCIUM CARB/MAGNESIUM 324 MG
162 TABLET ORAL ONCE
Refills: 0 | Status: COMPLETED | OUTPATIENT
Start: 2022-11-09 | End: 2022-11-09

## 2022-11-09 NOTE — ED ADULT NURSE NOTE - OBJECTIVE STATEMENT
77y Male presents to the ED brought in by EMS c/o chest pain. Pt states pain began around 5PM. Pt describes the pain as a dullness to the left upper chest, worsening with movement. Pt denies strain and trauma to the area. Pt endorses taking "4 small tablets" of aspirin at home. As per EMS on EKG was first degree block with a right bundle branch. PMH of HTN, HLD, and Pt was catheterized two times in the past, no stents placed. Pt is A&Ox4. Respirations spontaneous, unlabored, and equal bilaterally. Patient safety maintained, bed is in lowest position, wheels locked, and side rails raised.

## 2022-11-10 VITALS
HEART RATE: 53 BPM | RESPIRATION RATE: 16 BRPM | DIASTOLIC BLOOD PRESSURE: 77 MMHG | OXYGEN SATURATION: 98 % | TEMPERATURE: 98 F | SYSTOLIC BLOOD PRESSURE: 134 MMHG

## 2022-11-10 LAB
ALBUMIN SERPL ELPH-MCNC: 3.8 G/DL — SIGNIFICANT CHANGE UP (ref 3.3–5)
ALP SERPL-CCNC: 46 U/L — SIGNIFICANT CHANGE UP (ref 40–120)
ALT FLD-CCNC: 14 U/L — SIGNIFICANT CHANGE UP (ref 10–45)
ANION GAP SERPL CALC-SCNC: 9 MMOL/L — SIGNIFICANT CHANGE UP (ref 5–17)
AST SERPL-CCNC: 22 U/L — SIGNIFICANT CHANGE UP (ref 10–40)
BASOPHILS # BLD AUTO: 0.01 K/UL — SIGNIFICANT CHANGE UP (ref 0–0.2)
BASOPHILS NFR BLD AUTO: 0.2 % — SIGNIFICANT CHANGE UP (ref 0–2)
BILIRUB SERPL-MCNC: 0.7 MG/DL — SIGNIFICANT CHANGE UP (ref 0.2–1.2)
BUN SERPL-MCNC: 17 MG/DL — SIGNIFICANT CHANGE UP (ref 7–23)
CALCIUM SERPL-MCNC: 9.3 MG/DL — SIGNIFICANT CHANGE UP (ref 8.4–10.5)
CHLORIDE SERPL-SCNC: 105 MMOL/L — SIGNIFICANT CHANGE UP (ref 96–108)
CO2 SERPL-SCNC: 28 MMOL/L — SIGNIFICANT CHANGE UP (ref 22–31)
CREAT SERPL-MCNC: 1.26 MG/DL — SIGNIFICANT CHANGE UP (ref 0.5–1.3)
EGFR: 59 ML/MIN/1.73M2 — LOW
EOSINOPHIL # BLD AUTO: 0.1 K/UL — SIGNIFICANT CHANGE UP (ref 0–0.5)
EOSINOPHIL NFR BLD AUTO: 2.4 % — SIGNIFICANT CHANGE UP (ref 0–6)
GLUCOSE SERPL-MCNC: 92 MG/DL — SIGNIFICANT CHANGE UP (ref 70–99)
HCT VFR BLD CALC: 37.2 % — LOW (ref 39–50)
HGB BLD-MCNC: 11.7 G/DL — LOW (ref 13–17)
IMM GRANULOCYTES NFR BLD AUTO: 0.2 % — SIGNIFICANT CHANGE UP (ref 0–0.9)
LIDOCAIN IGE QN: 81 U/L — HIGH (ref 7–60)
LYMPHOCYTES # BLD AUTO: 1.34 K/UL — SIGNIFICANT CHANGE UP (ref 1–3.3)
LYMPHOCYTES # BLD AUTO: 32.7 % — SIGNIFICANT CHANGE UP (ref 13–44)
MCHC RBC-ENTMCNC: 27.2 PG — SIGNIFICANT CHANGE UP (ref 27–34)
MCHC RBC-ENTMCNC: 31.5 GM/DL — LOW (ref 32–36)
MCV RBC AUTO: 86.5 FL — SIGNIFICANT CHANGE UP (ref 80–100)
MONOCYTES # BLD AUTO: 0.45 K/UL — SIGNIFICANT CHANGE UP (ref 0–0.9)
MONOCYTES NFR BLD AUTO: 11 % — SIGNIFICANT CHANGE UP (ref 2–14)
NEUTROPHILS # BLD AUTO: 2.19 K/UL — SIGNIFICANT CHANGE UP (ref 1.8–7.4)
NEUTROPHILS NFR BLD AUTO: 53.5 % — SIGNIFICANT CHANGE UP (ref 43–77)
NRBC # BLD: 0 /100 WBCS — SIGNIFICANT CHANGE UP (ref 0–0)
PLATELET # BLD AUTO: 154 K/UL — SIGNIFICANT CHANGE UP (ref 150–400)
POTASSIUM SERPL-MCNC: 3.9 MMOL/L — SIGNIFICANT CHANGE UP (ref 3.5–5.3)
POTASSIUM SERPL-SCNC: 3.9 MMOL/L — SIGNIFICANT CHANGE UP (ref 3.5–5.3)
PROT SERPL-MCNC: 6.8 G/DL — SIGNIFICANT CHANGE UP (ref 6–8.3)
RBC # BLD: 4.3 M/UL — SIGNIFICANT CHANGE UP (ref 4.2–5.8)
RBC # FLD: 15.1 % — HIGH (ref 10.3–14.5)
SARS-COV-2 RNA SPEC QL NAA+PROBE: SIGNIFICANT CHANGE UP
SODIUM SERPL-SCNC: 142 MMOL/L — SIGNIFICANT CHANGE UP (ref 135–145)
TROPONIN T, HIGH SENSITIVITY RESULT: 20 NG/L — SIGNIFICANT CHANGE UP (ref 0–51)
TROPONIN T, HIGH SENSITIVITY RESULT: 21 NG/L — SIGNIFICANT CHANGE UP (ref 0–51)
WBC # BLD: 4.1 K/UL — SIGNIFICANT CHANGE UP (ref 3.8–10.5)
WBC # FLD AUTO: 4.1 K/UL — SIGNIFICANT CHANGE UP (ref 3.8–10.5)

## 2022-11-10 PROCEDURE — 93005 ELECTROCARDIOGRAM TRACING: CPT

## 2022-11-10 PROCEDURE — 85025 COMPLETE CBC W/AUTO DIFF WBC: CPT

## 2022-11-10 PROCEDURE — 36415 COLL VENOUS BLD VENIPUNCTURE: CPT

## 2022-11-10 PROCEDURE — U0003: CPT

## 2022-11-10 PROCEDURE — 83690 ASSAY OF LIPASE: CPT

## 2022-11-10 PROCEDURE — U0005: CPT

## 2022-11-10 PROCEDURE — 84484 ASSAY OF TROPONIN QUANT: CPT

## 2022-11-10 PROCEDURE — 71046 X-RAY EXAM CHEST 2 VIEWS: CPT

## 2022-11-10 PROCEDURE — 99285 EMERGENCY DEPT VISIT HI MDM: CPT | Mod: 25

## 2022-11-10 PROCEDURE — 71046 X-RAY EXAM CHEST 2 VIEWS: CPT | Mod: 26

## 2022-11-10 PROCEDURE — 80053 COMPREHEN METABOLIC PANEL: CPT

## 2022-11-10 RX ORDER — IBUPROFEN 200 MG
600 TABLET ORAL ONCE
Refills: 0 | Status: COMPLETED | OUTPATIENT
Start: 2022-11-10 | End: 2022-11-10

## 2022-11-10 RX ADMIN — Medication 600 MILLIGRAM(S): at 01:00

## 2022-11-10 RX ADMIN — Medication 600 MILLIGRAM(S): at 00:59

## 2022-11-10 NOTE — ED PROVIDER NOTE - NS ED ROS FT
Constitutional: no fevers, chills  HEENT: no HA, vision changes, rhinorrhea, sore throat  Cardiac: +chest pain, no palpitations  Respiratory: no SOB, cough or hemoptysis  GI: no n/v/d/c, abd pain, bloody or dark stools  : no dysuria, frequency, or hematuria  MSK: no joint pain, neck pain or back pain  Skin: no rashes, jaundice, pruritis  Neuro: no numbness/tingling, weakness, unsteady gait  ROS otherwise neg except per hpi

## 2022-11-10 NOTE — ED PROVIDER NOTE - CLINICAL SUMMARY MEDICAL DECISION MAKING FREE TEXT BOX
elderly male with cardiac risk factors presents with L sided cp without other anginal equivalents. possible msk v acs. low suspicion for PE. no GI or infectious sx.

## 2022-11-10 NOTE — ED PROVIDER NOTE - PATIENT PORTAL LINK FT
You can access the FollowMyHealth Patient Portal offered by [facility] by registering at the following website: [email or web address]. By joining [employer]’s FollowMyHealth portal, you will also be able to view your health information using other applications (apps) compatible with our system.

## 2022-11-10 NOTE — ED PROVIDER NOTE - ATTENDING CONTRIBUTION TO CARE
Patient with no previous history of cardiac pathology but w hx of HTN/HLD, here well several hours of substernal chest pain without any nausea, vomiting, shortness of breath, dizziness.  Patient is now asymptomatic status post oral medications.  Vitals within normal limits, exam benign, EKG nonischemic.  Initial troponin negative.  Patient offered e CDU for stress test and trending troponins but it is full. pt opted for close outpt f/u w cards vs admission to hospital at this time. Given negative troponin and EKG, story not very consistent with angina, patient appears safe for discharge home with however understanding that there is always a chance that this could be cardiac in nature and that would require repeat ED visit if symptoms return or worsen.

## 2022-11-10 NOTE — ED PROVIDER NOTE - PHYSICAL EXAMINATION
General: non-toxic, NAD  HEENT: NCAT, PERRL  Cardiac: RRR, no murmurs, 2+ peripheral pulses  Resp: CTAB  Abdomen: soft, non-distended, bowel sounds present, no ttp, no rebound or guarding. no organomegaly  Extremities: no peripheral edema, calf tenderness, or leg size discrepancies  Skin: no rashes  Neuro: AAOx4, 5+motor, sensation grossly intact  Psych: mood and affect appropriate

## 2022-11-10 NOTE — ED PROVIDER NOTE - OBJECTIVE STATEMENT
hx htn hld presents with dull L lateral chest wall pain since 1600. has had similar episodes in the past. worse with arm movement. nonexertional. nonradicular. no diaphoresis n/v no numbness/tingling. no trauma or rashes. no f/c or cough. had cardiac cath x2 last in 2012. stress test last year, states that he is due for another.

## 2022-11-10 NOTE — ED PROVIDER NOTE - NSFOLLOWUPINSTRUCTIONS_ED_ALL_ED_FT
You were seen in the Emergency Department for chest pain.     1) Advance activity as tolerated.   2) Continue all previously prescribed medications as directed.    3) Follow up with your primary care physician in 24-48 hours - take copies of your results.    4) Return to the Emergency Department for worsening or persistent symptoms, and/or ANY NEW OR CONCERNING SYMPTOMS.    continue taking daily aspirin  tylenol and motrin can be taken for pain, as well as lidocaine patches which can be found over the counter.   please call your cardiologist to get expedited follow up for your stress test as initially intended.     Chest Pain    Chest pain can be caused by many different conditions which may or may not be dangerous. Causes include heartburn, lung infections, heart attack, blood clot in lungs, skin infections, strain or damage to muscle, cartilage, or bones, etc. In addition to a history and physical examination, an electrocardiogram (ECG) or other lab tests may have been performed to determine the cause of your chest pain. Follow up with your primary care provider or with a cardiologist as instructed.     SEEK IMMEDIATE MEDICAL CARE IF YOU HAVE ANY OF THE FOLLOWING SYMPTOMS: worsening chest pain, coughing up blood, unexplained back/neck/jaw pain, severe abdominal pain, dizziness or lightheadedness, fainting, shortness of breath, sweaty or clammy skin, vomiting, or racing heart beat. These symptoms may represent a serious problem that is an emergency. Do not wait to see if the symptoms will go away. Get medical help right away. Call 911 and do not drive yourself to the hospital.

## 2022-11-22 ENCOUNTER — RESULT REVIEW (OUTPATIENT)
Age: 77
End: 2022-11-22

## 2023-10-18 NOTE — ED ADULT NURSE NOTE - DRUG PRE-SCREENING (DAST -1)
Preferred method of results communication: Phone: Okay to leave a message containing results?    Health Maintenance Due   Topic Date Due   • Hepatitis B Vaccine (1 of 3 - 3-dose series) Never done   • COVID-19 Vaccine (3 - Moderna series) 04/15/2022   • Depression Screening  04/29/2023       Patient is due for topics listed above, he wishes to proceed with Immunization(s) Influenza, but is not proceeding with Immunization(s) COVID-19 at this time.     Review Flowsheet  More data exists       10/18/2023   PHQ 2/9 Score   Adult PHQ 2 Score 0   Adult PHQ 2 Interpretation No further screening needed   Little interest or pleasure in activity? Not at all   Feeling down, depressed or hopeless? Not at all        Advance Directives:  not discussed        Statement Selected